# Patient Record
Sex: MALE | Race: WHITE | Employment: OTHER | URBAN - METROPOLITAN AREA
[De-identification: names, ages, dates, MRNs, and addresses within clinical notes are randomized per-mention and may not be internally consistent; named-entity substitution may affect disease eponyms.]

---

## 2017-05-25 PROBLEM — E66.811 OBESITY (BMI 30.0-34.9): Status: ACTIVE | Noted: 2017-05-25

## 2017-05-25 PROBLEM — R73.01 IMPAIRED FASTING GLUCOSE: Status: ACTIVE | Noted: 2017-05-25

## 2017-05-25 PROBLEM — I77.810 ECTATIC THORACIC AORTA (HCC): Status: ACTIVE | Noted: 2017-05-25

## 2017-05-25 PROBLEM — E66.9 OBESITY (BMI 30.0-34.9): Status: ACTIVE | Noted: 2017-05-25

## 2017-05-25 PROBLEM — I77.810 ECTATIC THORACIC AORTA: Status: ACTIVE | Noted: 2017-05-25

## 2017-12-29 PROBLEM — M18.12 DEGENERATIVE ARTHRITIS OF THUMB, LEFT: Status: ACTIVE | Noted: 2017-12-29

## 2018-10-18 PROBLEM — I77.1 TORTUOUS AORTA: Status: ACTIVE | Noted: 2018-10-18

## 2018-10-18 PROBLEM — I77.1 TORTUOUS AORTA (HCC): Status: ACTIVE | Noted: 2018-10-18

## 2018-11-01 ENCOUNTER — TELEPHONE (OUTPATIENT)
Dept: SURGERY | Facility: CLINIC | Age: 78
End: 2018-11-01

## 2018-11-01 NOTE — TELEPHONE ENCOUNTER
spoke with patient's PCP-.     Per  patient to be added on to 's schedule for tomorrow 11/2/18 at 10:30 am.

## 2018-11-02 ENCOUNTER — TELEPHONE (OUTPATIENT)
Dept: SURGERY | Facility: CLINIC | Age: 78
End: 2018-11-02

## 2018-11-02 ENCOUNTER — LAB ENCOUNTER (OUTPATIENT)
Dept: LAB | Age: 78
End: 2018-11-02
Attending: RADIOLOGY
Payer: MEDICARE

## 2018-11-02 ENCOUNTER — OFFICE VISIT (OUTPATIENT)
Dept: SURGERY | Facility: CLINIC | Age: 78
End: 2018-11-02
Payer: MEDICARE

## 2018-11-02 VITALS
HEART RATE: 60 BPM | WEIGHT: 186 LBS | SYSTOLIC BLOOD PRESSURE: 124 MMHG | BODY MASS INDEX: 29.19 KG/M2 | HEIGHT: 67 IN | DIASTOLIC BLOOD PRESSURE: 80 MMHG

## 2018-11-02 DIAGNOSIS — I72.9 ANEURYSM (HCC): Primary | ICD-10-CM

## 2018-11-02 DIAGNOSIS — I72.9 ANEURYSM (HCC): ICD-10-CM

## 2018-11-02 PROCEDURE — 85610 PROTHROMBIN TIME: CPT

## 2018-11-02 PROCEDURE — 99203 OFFICE O/P NEW LOW 30 MIN: CPT | Performed by: NEUROLOGICAL SURGERY

## 2018-11-02 PROCEDURE — 85025 COMPLETE CBC W/AUTO DIFF WBC: CPT

## 2018-11-02 PROCEDURE — 36415 COLL VENOUS BLD VENIPUNCTURE: CPT

## 2018-11-02 PROCEDURE — 85730 THROMBOPLASTIN TIME PARTIAL: CPT

## 2018-11-02 NOTE — PATIENT INSTRUCTIONS
Refill policies:    • Allow 2-3 business days for refills; controlled substances may take longer.   • Contact your pharmacy at least 5 days prior to running out of medication and have them send an electronic request or submit request through the “request re entire amount billed. Precertification and Prior Authorizations: If your physician has recommended that you have a procedure or additional testing performed.   [facility]) will contact your insurance carrier to obtain pre-certi herbal supplements for 10-14 days prior to procedure. · You may continue Aspirin, Plavix and nonsteroidal anti-inflammatories up to and including the day of procedure. · Nothing to eat or drink after midnight the night prior to procedure.    · Labs should

## 2018-11-02 NOTE — TELEPHONE ENCOUNTER
Patient scheduled for a Cerebrl Angiogram on November 14 2018 at 10:00  Discussed Angiogram instructions with patient:     · Arrive one hour prior to scheduled procedure start time and get registered at the [place], it is located at the [facility]

## 2018-11-02 NOTE — H&P
Neurosurgery Clinic Visit  2018    Tana Oviedo PCP:  Susan Landeros MD    3/5/1940 MRN [id number]       CHIEF COMPLAINT:  Patient presents with:   Follow - Up: Brain Aneurysm       HISTORY OF PRESENT ILLNESS:  Tana Oviedo is a(n) 70year old male wh Hearing is intact and symmetric. Neck: Reveals no masses. Breast:  Exam is deferred. Skin:  Exam reveals no obvious lesions or other indications of disease. A detailed skin exam was not performed.   Heart:  Regular rate and rhythm  Lungs: Clear to ausc with newly diagnosed aneurysms  I reviewed his case with Dr. Gary James, we recommend a cerebral angiogram  I reviewed the films with the patient.   We long discussion about treatment of aneurysms  We discussed observation versus coiling versus surgery  We kiran

## 2018-11-02 NOTE — PROGRESS NOTES
Pt is here for brain aneurysm. Imaging Review CT and CTA of the brain. MRI of the brain.  Pt states no symptome currently

## 2018-11-09 NOTE — TELEPHONE ENCOUNTER
PA not required. See referral pool for additional information. Nothing further needed for upcoming procedure.

## 2018-11-12 RX ORDER — ASPIRIN 81 MG/1
81 TABLET, CHEWABLE ORAL DAILY
COMMUNITY
End: 2019-02-28 | Stop reason: ALTCHOICE

## 2018-11-12 NOTE — TELEPHONE ENCOUNTER
Case being moved to 8:00 am start time. Patient to arrive at 7:00 am. Patient informed and had no further questions. Nothing further needed for upcoming surgery.

## 2018-11-14 ENCOUNTER — HOSPITAL ENCOUNTER (OUTPATIENT)
Dept: INTERVENTIONAL RADIOLOGY/VASCULAR | Facility: HOSPITAL | Age: 78
Discharge: HOME | End: 2018-11-14
Attending: RADIOLOGY | Admitting: RADIOLOGY
Payer: MEDICARE

## 2018-11-14 VITALS
DIASTOLIC BLOOD PRESSURE: 73 MMHG | RESPIRATION RATE: 11 BRPM | TEMPERATURE: 97 F | OXYGEN SATURATION: 95 % | HEIGHT: 67 IN | HEART RATE: 55 BPM | SYSTOLIC BLOOD PRESSURE: 111 MMHG | BODY MASS INDEX: 29.19 KG/M2 | WEIGHT: 186 LBS

## 2018-11-14 DIAGNOSIS — I72.9 ANEURYSM (HCC): ICD-10-CM

## 2018-11-14 PROCEDURE — 99152 MOD SED SAME PHYS/QHP 5/>YRS: CPT

## 2018-11-14 PROCEDURE — 76937 US GUIDE VASCULAR ACCESS: CPT

## 2018-11-14 PROCEDURE — 36224 PLACE CATH CAROTD ART: CPT

## 2018-11-14 PROCEDURE — 36226 PLACE CATH VERTEBRAL ART: CPT

## 2018-11-14 PROCEDURE — 99153 MOD SED SAME PHYS/QHP EA: CPT

## 2018-11-14 PROCEDURE — B31FYZZ FLUOROSCOPY OF LEFT VERTEBRAL ARTERY USING OTHER CONTRAST: ICD-10-PCS | Performed by: RADIOLOGY

## 2018-11-14 PROCEDURE — B318YZZ FLUOROSCOPY OF BILATERAL INTERNAL CAROTID ARTERIES USING OTHER CONTRAST: ICD-10-PCS | Performed by: RADIOLOGY

## 2018-11-14 PROCEDURE — B315YZZ FLUOROSCOPY OF BILATERAL COMMON CAROTID ARTERIES USING OTHER CONTRAST: ICD-10-PCS | Performed by: RADIOLOGY

## 2018-11-14 PROCEDURE — 76377 3D RENDER W/INTRP POSTPROCES: CPT

## 2018-11-14 RX ORDER — CEFAZOLIN SODIUM/WATER 2 G/20 ML
SYRINGE (ML) INTRAVENOUS
Status: COMPLETED
Start: 2018-11-14 | End: 2018-11-14

## 2018-11-14 RX ORDER — LIDOCAINE HYDROCHLORIDE 10 MG/ML
INJECTION, SOLUTION INFILTRATION; PERINEURAL
Status: COMPLETED
Start: 2018-11-14 | End: 2018-11-14

## 2018-11-14 RX ORDER — ONDANSETRON 2 MG/ML
4 INJECTION INTRAMUSCULAR; INTRAVENOUS EVERY 6 HOURS PRN
Status: DISCONTINUED | OUTPATIENT
Start: 2018-11-14 | End: 2018-11-14

## 2018-11-14 RX ORDER — MORPHINE SULFATE 4 MG/ML
1 INJECTION, SOLUTION INTRAMUSCULAR; INTRAVENOUS EVERY 2 HOUR PRN
Status: DISCONTINUED | OUTPATIENT
Start: 2018-11-14 | End: 2018-11-14

## 2018-11-14 RX ORDER — ACETAMINOPHEN 650 MG/1
650 SUPPOSITORY RECTAL EVERY 4 HOURS PRN
Status: DISCONTINUED | OUTPATIENT
Start: 2018-11-14 | End: 2018-11-14

## 2018-11-14 RX ORDER — METOCLOPRAMIDE HYDROCHLORIDE 5 MG/ML
10 INJECTION INTRAMUSCULAR; INTRAVENOUS EVERY 8 HOURS PRN
Status: DISCONTINUED | OUTPATIENT
Start: 2018-11-14 | End: 2018-11-14

## 2018-11-14 RX ORDER — HEPARIN SODIUM 5000 [USP'U]/ML
INJECTION, SOLUTION INTRAVENOUS; SUBCUTANEOUS
Status: COMPLETED
Start: 2018-11-14 | End: 2018-11-14

## 2018-11-14 RX ORDER — MIDAZOLAM HYDROCHLORIDE 1 MG/ML
INJECTION INTRAMUSCULAR; INTRAVENOUS
Status: COMPLETED
Start: 2018-11-14 | End: 2018-11-14

## 2018-11-14 RX ORDER — ACETAMINOPHEN 325 MG/1
650 TABLET ORAL EVERY 4 HOURS PRN
Status: DISCONTINUED | OUTPATIENT
Start: 2018-11-14 | End: 2018-11-14

## 2018-11-14 RX ORDER — MORPHINE SULFATE 4 MG/ML
2 INJECTION, SOLUTION INTRAMUSCULAR; INTRAVENOUS EVERY 2 HOUR PRN
Status: DISCONTINUED | OUTPATIENT
Start: 2018-11-14 | End: 2018-11-14

## 2018-11-14 NOTE — PROCEDURES
[facility]  Pre-Procedural Note  Daysi Wilkerson Patient Status:  Outpatient in a Bed    3/5/1940 MRN [id number]   Location [address] [facility] Attending Maribel Lopez MD   Hosp Day # 0 PCP Komal Salguero MD     Procedure: Cerebral a

## 2018-11-14 NOTE — PROCEDURES
[facility]  Neurointerventional Surgery Operative Report  Bennye Agent Patient Status:  Outpatient in a Bed    3/5/1940 MRN [id number]   Location [address] [facility] Attending Viet Guevara MD   Hosp Day # 0 PCP Praveena Gallo MD

## 2018-11-14 NOTE — PROGRESS NOTES
History & Physical Examination    Patient Name: Gloria Garcia  MRN: [id number]  CSN: [id number]  YOB: 1940    Diagnosis: Cerebral Aneurysms    Present Illness:   Patient was seen in the office with Dr. Zac Watts on 11/2/18.      Elijah Bennett is Procedure Laterality Date   • COLONOSCOPY  2011    wnl's   • OTHER SURGICAL HISTORY  2010    Epididimectomy; R testicle atrophied after the surgery. Was part of a vasectomy.     • OTHER SURGICAL HISTORY  201    Nasal polyp     Family History   Problem Rel

## 2018-11-20 ENCOUNTER — TELEPHONE (OUTPATIENT)
Dept: SURGERY | Facility: CLINIC | Age: 78
End: 2018-11-20

## 2018-11-21 NOTE — TELEPHONE ENCOUNTER
Informed patient that the plan was to discuss angiogram results at [facility] conference and then review with Dr. Tania Yung. Will have Dr. Tania Yung reach out to Dr. Zulay Vences about treatment plan and get back to him next week.      Patient states going to [place] in [facility]

## 2018-11-24 NOTE — PROCEDURES
PROCEDURE:  Cerebral angiography    : Dr Eleni Adrian, Interventional Neuroradiology / Neurointerventional Surgery     INDICATIONS: Multiple unruptured aneurysms, including large right pericallosal aneurysm.        COMPARISON:    PROCEDURES  PER observers and assisted in monitoring the patient’s level of consciousness and physiological status throughout the procedure, and had no other duties during the procedure.  The names of the IR nurses who served in this capacity are also noted in the IR flows right internal carotid artery with biplane projections and 3D spin angiogram centered over the head demonstrates a normal appearance of the carotid siphon.  A smooth 2.5mm aneurysm with a 1.5mm neck projects posteriorly from the dorsal origin of the left TRISR [facility] branches including both PCAs. There is good reflux sown the right vertebral artery with opacification of the right PICA.     CONCLUSION:      Multiple aneurysms: Right ESTIVEN Pericallosal 11mm saccular wide neck, R ACOM 3mm saccular, ROGER term 2.5mm saccular,

## 2018-11-27 NOTE — TELEPHONE ENCOUNTER
Informed pt of message below, he acknowledged and will wait for call from provider with POC    Discussed with , He states patient was presented at [facility] while he was away and has been trying to reach out to the Vascular Dr. There without

## 2018-11-28 NOTE — TELEPHONE ENCOUNTER
Dr. Pk Feliciano spoke to the patient. Per Dr. Pk Feliciano he was present at the Vascular Conference and the consensus was that this can not be coiled and they should proceed with open surgery.   Per Dr. Pk Feliciano this was already relayed to Dr. Mcdaniel Bolus however Dr. Nirmal Garcia

## 2018-11-29 NOTE — TELEPHONE ENCOUNTER
Called and spoke with pt  i talked to dr Cyrus Salgado  rec surgical clipping of acomm aneurysm  Pt agrees  Wants to wait until January for surgery  Sent info to dr Cyrus Salgado  He will see patient and we will plan from there

## 2018-11-30 NOTE — TELEPHONE ENCOUNTER
Pt called and informed he should call file room and request imaging, (angiograam and CTA) he can pick it up and take with to his upcoming appt. # to file room provided. No notes from [place] regarding this. Office notes printed and faxed.   Request sen

## 2018-11-30 NOTE — TELEPHONE ENCOUNTER
Pt called back stating there was no answer at file room but he left message. Informed him I would try as well. Called file room, left message requesting they have imaging ready for pt.

## 2018-11-30 NOTE — TELEPHONE ENCOUNTER
Dr. Donny Darling office calling for medical records for pt's appt w/them on 12/04; states Silvia Ghosh was working on getting records to them. Fax is [phone]. Imaging needed as well.

## 2018-12-03 ENCOUNTER — TELEPHONE (OUTPATIENT)
Dept: SURGERY | Facility: CLINIC | Age: 78
End: 2018-12-03

## 2018-12-03 NOTE — TELEPHONE ENCOUNTER
Called patient to inquire about obtaining imaging through [facility] including CTA/CTH and MRI images    Apparently he submitted a request last week on Thursday or Friday but was told it would take up to 5 business to obtain results.     I called [facility] imaging ([phone]

## 2018-12-03 NOTE — TELEPHONE ENCOUNTER
Isabella from IR ran pt's CD to [facility] office; PSR called pt to inform him we had the CD; pt states he received all necessary imaging from [facility] but would like this CD mailed to his home. CD placed in mail on 12/05.

## 2018-12-04 ENCOUNTER — TELEPHONE (OUTPATIENT)
Dept: SURGERY | Facility: CLINIC | Age: 78
End: 2018-12-04

## 2019-02-12 ENCOUNTER — TELEPHONE (OUTPATIENT)
Dept: SURGERY | Facility: CLINIC | Age: 79
End: 2019-02-12

## 2019-02-12 DIAGNOSIS — I72.9 ANEURYSM (HCC): Primary | ICD-10-CM

## 2019-02-12 NOTE — TELEPHONE ENCOUNTER
Patient scheduled for Diagnostic Cerebral angiogram on 4- at 0800    Discussed Angiogram instructions with patient:    · Arrive one hour prior to scheduled procedure start time and get registered at the [place], it is located at the [facility]

## 2019-02-28 ENCOUNTER — OFFICE VISIT (OUTPATIENT)
Dept: NEUROLOGY | Facility: CLINIC | Age: 79
End: 2019-02-28
Payer: MEDICARE

## 2019-02-28 VITALS
RESPIRATION RATE: 14 BRPM | DIASTOLIC BLOOD PRESSURE: 70 MMHG | WEIGHT: 188 LBS | SYSTOLIC BLOOD PRESSURE: 120 MMHG | BODY MASS INDEX: 29 KG/M2 | HEART RATE: 64 BPM

## 2019-02-28 DIAGNOSIS — I67.1 BRAIN ANEURYSM: ICD-10-CM

## 2019-02-28 DIAGNOSIS — G40.909 SEIZURE CEREBRAL (HCC): Primary | ICD-10-CM

## 2019-02-28 DIAGNOSIS — Z98.890 S/P CRANIOTOMY: ICD-10-CM

## 2019-02-28 PROCEDURE — 99204 OFFICE O/P NEW MOD 45 MIN: CPT | Performed by: OTHER

## 2019-02-28 RX ORDER — MAGNESIUM OXIDE 400 MG (241.3 MG MAGNESIUM) TABLET
400 TABLET DAILY
COMMUNITY
End: 2019-12-03 | Stop reason: ALTCHOICE

## 2019-02-28 RX ORDER — LEVETIRACETAM 1000 MG/1
1 TABLET ORAL 2 TIMES DAILY
Refills: 1 | COMMUNITY
Start: 2019-02-19 | End: 2019-02-28

## 2019-02-28 RX ORDER — LEVETIRACETAM 750 MG/1
750 TABLET ORAL 2 TIMES DAILY
Qty: 180 TABLET | Refills: 3 | Status: SHIPPED | OUTPATIENT
Start: 2019-02-28 | End: 2019-05-16

## 2019-02-28 NOTE — PROGRESS NOTES
[facility]   Neurology; INITIAL CLINIC VISIT  2019, 8:29 AM     Sherley Diaz Patient Status:  No patient class for patient encounter    3/5/1940 MRN [id number]   Location [address] [place] [address] FAMILY HISTORY:  family history includes Cancer in his mother; Other in his brother and father. SOCIAL HISTORY:   reports that he quit smoking about 11 years ago. He smoked 1.00 pack per day.  he has never used smokeless tobacco. He reports that he murmur  Extremities:  No edema or cyanosis, pulse is normal.  Skin:  No unusual lesions    Neurologic Examination:  Mental status: he is awake, alert, oriented to time, name and place, Mentally appropriate  for age;  Language and speech: language is intact always have a second opinion.    As far as fatigue is side effect of keppra, if he had a normal EEG as reported by wife we can cut down keppra to 750 mg bid till July, if he remains seizure free, then down to 500 mg bid,   If he is seizure free for a year,

## 2019-03-22 NOTE — TELEPHONE ENCOUNTER
Spoke to Taiwo at [facility], Cerebral Angiogram (90904, [id number]) is valid and billable, no copayment applies to the service, covered at 100% of the allowed amount. No prior authorization or predetermination required.  Call reference #[id number], time on call: 1

## 2019-04-10 ENCOUNTER — HOSPITAL ENCOUNTER (OUTPATIENT)
Dept: INTERVENTIONAL RADIOLOGY/VASCULAR | Facility: HOSPITAL | Age: 79
Discharge: HOME | End: 2019-04-10
Attending: RADIOLOGY | Admitting: RADIOLOGY
Payer: MEDICARE

## 2019-04-10 VITALS
WEIGHT: 182 LBS | SYSTOLIC BLOOD PRESSURE: 128 MMHG | HEART RATE: 49 BPM | TEMPERATURE: 97 F | OXYGEN SATURATION: 99 % | DIASTOLIC BLOOD PRESSURE: 95 MMHG | RESPIRATION RATE: 16 BRPM | BODY MASS INDEX: 27.58 KG/M2 | HEIGHT: 68 IN

## 2019-04-10 DIAGNOSIS — I72.9 ANEURYSM (HCC): ICD-10-CM

## 2019-04-10 PROCEDURE — 99152 MOD SED SAME PHYS/QHP 5/>YRS: CPT

## 2019-04-10 PROCEDURE — B318YZZ FLUOROSCOPY OF BILATERAL INTERNAL CAROTID ARTERIES USING OTHER CONTRAST: ICD-10-PCS | Performed by: RADIOLOGY

## 2019-04-10 PROCEDURE — 80048 BASIC METABOLIC PNL TOTAL CA: CPT | Performed by: RADIOLOGY

## 2019-04-10 PROCEDURE — 85025 COMPLETE CBC W/AUTO DIFF WBC: CPT | Performed by: RADIOLOGY

## 2019-04-10 PROCEDURE — 36415 COLL VENOUS BLD VENIPUNCTURE: CPT

## 2019-04-10 PROCEDURE — 85730 THROMBOPLASTIN TIME PARTIAL: CPT | Performed by: RADIOLOGY

## 2019-04-10 PROCEDURE — 36224 PLACE CATH CAROTD ART: CPT

## 2019-04-10 PROCEDURE — 76377 3D RENDER W/INTRP POSTPROCES: CPT

## 2019-04-10 PROCEDURE — 85610 PROTHROMBIN TIME: CPT | Performed by: RADIOLOGY

## 2019-04-10 PROCEDURE — 76937 US GUIDE VASCULAR ACCESS: CPT

## 2019-04-10 PROCEDURE — 99153 MOD SED SAME PHYS/QHP EA: CPT

## 2019-04-10 RX ORDER — ACETAMINOPHEN 650 MG/1
650 SUPPOSITORY RECTAL EVERY 4 HOURS PRN
Status: CANCELLED | OUTPATIENT
Start: 2019-04-10

## 2019-04-10 RX ORDER — MIDAZOLAM HYDROCHLORIDE 1 MG/ML
INJECTION INTRAMUSCULAR; INTRAVENOUS
Status: COMPLETED
Start: 2019-04-10 | End: 2019-04-10

## 2019-04-10 RX ORDER — MORPHINE SULFATE 4 MG/ML
2 INJECTION, SOLUTION INTRAMUSCULAR; INTRAVENOUS EVERY 2 HOUR PRN
Status: CANCELLED | OUTPATIENT
Start: 2019-04-10

## 2019-04-10 RX ORDER — ONDANSETRON 2 MG/ML
4 INJECTION INTRAMUSCULAR; INTRAVENOUS EVERY 6 HOURS PRN
Status: CANCELLED | OUTPATIENT
Start: 2019-04-10

## 2019-04-10 RX ORDER — LIDOCAINE HYDROCHLORIDE 10 MG/ML
INJECTION, SOLUTION INFILTRATION; PERINEURAL
Status: COMPLETED
Start: 2019-04-10 | End: 2019-04-10

## 2019-04-10 RX ORDER — MORPHINE SULFATE 4 MG/ML
1 INJECTION, SOLUTION INTRAMUSCULAR; INTRAVENOUS EVERY 2 HOUR PRN
Status: CANCELLED | OUTPATIENT
Start: 2019-04-10

## 2019-04-10 RX ORDER — HEPARIN SODIUM 5000 [USP'U]/ML
INJECTION, SOLUTION INTRAVENOUS; SUBCUTANEOUS
Status: COMPLETED
Start: 2019-04-10 | End: 2019-04-10

## 2019-04-10 RX ORDER — ACETAMINOPHEN 325 MG/1
650 TABLET ORAL EVERY 4 HOURS PRN
Status: CANCELLED | OUTPATIENT
Start: 2019-04-10

## 2019-04-10 RX ORDER — VERAPAMIL HYDROCHLORIDE 2.5 MG/ML
INJECTION, SOLUTION INTRAVENOUS
Status: DISCONTINUED
Start: 2019-04-10 | End: 2019-04-10 | Stop reason: WASHOUT

## 2019-04-10 RX ORDER — METOCLOPRAMIDE HYDROCHLORIDE 5 MG/ML
10 INJECTION INTRAMUSCULAR; INTRAVENOUS EVERY 8 HOURS PRN
Status: CANCELLED | OUTPATIENT
Start: 2019-04-10

## 2019-04-10 NOTE — PROGRESS NOTES
Pt s/p cerebral angiogram with Dr. Caity Soto. Right groin manual pressure, dressing CDI, no bleeding or hematoma. +1 pedal pulses. Neuro status unchanged and intact. Dr. Caity Soto came and spoke to pt and pt wife.  Discharge instructions reviewed with pt and pt w

## 2019-04-10 NOTE — H&P
History & Physical Examination    Patient Name: [place] Herrera  MRN: [id number]  CSN: [id number]  YOB: 1940    Diagnosis: Multiple unruptured aneurysms    Present Illness:   Juan Ghotra is a 70year old male who while undergoing work up for TIA (h APTT      26.1 - 34.6 seconds 27.9       No current facility-administered medications for this encounter.      Allergies: No Known Allergies    Past Medical History:   Diagnosis Date   • Cerebral aneurysm      Past Surgical History:   Procedure Laterality

## 2019-05-15 NOTE — PROCEDURES
[facility]  Neurointerventional Surgery Operative Report  Lauren Romano Patient Status:  Outpatient in a Bed    3/5/1940 MRN [id number]   Location [address] [facility] Attending No att. providers found   Hosp Day # 0 PCP Chema Bruno, dated 11/14/2018    PROCEDURES  PERFORMED:    Micropuncture access and sheath placement in the right common femoral artery using ultrasound guidance with image documentation. Selective catheterization and angiography of the right internal carotid artery. mins.    INFORMED CONSENT: The rationale, benefit, risks and alternatives to the procedure were explained to the patient and their family before the procedure.  They understood that the risks include but are not limited to stroke, vascular injury, headache, normal.    Angiography of the left internal carotid artery with 3D spin angiogram demonstrates a normal appearance of the carotid siphon apart from mild atheromatous contour undulation. There is no residual left pericallosal aneurysm.  The tiny sessile bump

## 2019-05-16 ENCOUNTER — OFFICE VISIT (OUTPATIENT)
Dept: SURGERY | Facility: CLINIC | Age: 79
End: 2019-05-16
Payer: MEDICARE

## 2019-05-16 ENCOUNTER — OFFICE VISIT (OUTPATIENT)
Dept: NEUROLOGY | Facility: CLINIC | Age: 79
End: 2019-05-16
Payer: MEDICARE

## 2019-05-16 VITALS
DIASTOLIC BLOOD PRESSURE: 68 MMHG | SYSTOLIC BLOOD PRESSURE: 112 MMHG | HEART RATE: 68 BPM | RESPIRATION RATE: 16 BRPM | BODY MASS INDEX: 30 KG/M2 | WEIGHT: 197 LBS

## 2019-05-16 VITALS — DIASTOLIC BLOOD PRESSURE: 76 MMHG | SYSTOLIC BLOOD PRESSURE: 122 MMHG | HEART RATE: 75 BPM

## 2019-05-16 DIAGNOSIS — I72.9 ANEURYSM (HCC): ICD-10-CM

## 2019-05-16 DIAGNOSIS — Z98.890 S/P CRANIOTOMY: Primary | ICD-10-CM

## 2019-05-16 DIAGNOSIS — G40.909 SEIZURE CEREBRAL (HCC): ICD-10-CM

## 2019-05-16 DIAGNOSIS — I67.1 BRAIN ANEURYSM: Primary | ICD-10-CM

## 2019-05-16 DIAGNOSIS — Z98.890 S/P CRANIOTOMY: ICD-10-CM

## 2019-05-16 PROCEDURE — 99213 OFFICE O/P EST LOW 20 MIN: CPT | Performed by: NEUROLOGICAL SURGERY

## 2019-05-16 PROCEDURE — 99214 OFFICE O/P EST MOD 30 MIN: CPT | Performed by: OTHER

## 2019-05-16 RX ORDER — IBUPROFEN 800 MG/1
800 TABLET ORAL EVERY 6 HOURS PRN
COMMUNITY
End: 2019-12-03 | Stop reason: ALTCHOICE

## 2019-05-16 RX ORDER — LEVETIRACETAM 500 MG/1
500 TABLET ORAL 2 TIMES DAILY
Qty: 180 TABLET | Refills: 3 | Status: SHIPPED | OUTPATIENT
Start: 2019-05-16 | End: 2019-09-27

## 2019-05-16 NOTE — PROGRESS NOTES
[facility]   Neurology; follow up  CLINIC VISIT  2019    Dominguez Nieto Patient Status:  No patient class for patient encounter    3/5/1940 MRN [id number]   Location [address] [place] [address], [address] [place] [address], [address] [place] [address] He has never used smokeless tobacco. He reports that he does not drink alcohol or use drugs. ALLERGIES:  No Known Allergies    MEDICATIONS:    Current Outpatient Medications:  AMOXICILLIN OR Take 1 capsule by mouth 3 (three) times daily.  Disp:  Rfl: are clear to auscultation  Heart: normal SR, no murmur  Extremities:  No edema or cyanosis, pulse is normal.  Skin:  No unusual lesions    Neurologic Examination:  Mental status: he is awake, alert, oriented to time, name and place, Mentally appropriate  f to driving.       fatigue is side effect of keppra,  Since he had a normal EEG in end of January at discharge,  as reported by wife we can cut down keppra to    500 mg bid,   If he is seizure free for a year, repeat EEG in Jan 2020, he will call back for or

## 2019-05-16 NOTE — PROGRESS NOTES
Pt reports no decline in condition since last visit. Pt does report loss of sense of taste and smell, pt also reports loss of stamina. Pt reports an episode where he experienced headaches.

## 2019-05-16 NOTE — PROGRESS NOTES
PT here for f/o post surgery. PT stated he feels relatively well. Site has heeled nicely, no HA, denies issues with vision, gait WNL, no bowel or bladder issues.

## 2019-05-16 NOTE — PROGRESS NOTES
Neurosurgery Clinic Visit  2019    Laine Jaeger PCP:  Claude Pate MD    3/5/1940 MRN [id number]     HISTORY OF PRESENT ILLNESS:  Laine Jaeger is a(n) 70year old male here for follow-up status post craniotomy for multiple aneurysm clippings  He is

## 2019-09-27 ENCOUNTER — PATIENT MESSAGE (OUTPATIENT)
Dept: NEUROLOGY | Facility: CLINIC | Age: 79
End: 2019-09-27

## 2019-09-27 DIAGNOSIS — G40.909 SEIZURE CEREBRAL (HCC): Primary | ICD-10-CM

## 2019-09-27 RX ORDER — LEVETIRACETAM 500 MG/1
500 TABLET ORAL 2 TIMES DAILY
Qty: 180 TABLET | Refills: 3 | Status: SHIPPED | OUTPATIENT
Start: 2019-09-27 | End: 2020-03-12 | Stop reason: ALTCHOICE

## 2019-09-27 NOTE — PROGRESS NOTES
Called [facility] [employer] thet last [facility] went to, D/C'd order due to new order being sent to a different pharmacy at pt request.

## 2019-09-27 NOTE — PROGRESS NOTES
Pt sent message through [employer] requesting refill of Keppra, pt has current RX with refills remaining, however pt requests refill be sent to different pharmacy. If new RX is signed, will call previous pharmacy and D/C prior RX.            Medication: lev

## 2019-09-27 NOTE — TELEPHONE ENCOUNTER
From: Marcela Kamara  To: Magdi Rubin MD  Sent: 9/27/2019 12:50 PM CDT  Subject: Prescription Question    Need new prescription for generic Keppra 250mg.  Please use [email or web address], Constantin Carcamo  Thank you,  [facility]

## 2019-09-30 ENCOUNTER — TELEPHONE (OUTPATIENT)
Dept: NEUROLOGY | Facility: CLINIC | Age: 79
End: 2019-09-30

## 2019-09-30 NOTE — PROGRESS NOTES
Patient states he his not having side effects other than being too tired to play golf some days. Patient states he is willing to stay on 500 mg BID, but would like to be seen in December 2019 instead of January 2020.  Patient would like to have EEG done

## 2019-09-30 NOTE — TELEPHONE ENCOUNTER
Pharmacist states there waas a note from patient from last [place] that is to be on Keppra 250mg. Pharmacist notified that [facility] nurse spoke with patient today and clarified that he is to take 500mg bid according to last MD note.

## 2019-09-30 NOTE — PROGRESS NOTES
Patient called and very upset that current Rx for Levetiracetam was sent to pharmacy for 500 mg BID.  Patient states he was to taper to 250 mg BID then stopped    Informed patient that Dr. Hector Iyer notes from [address] [place] [address] on 5/16/2019 were very clear that the patient

## 2019-10-01 NOTE — PROGRESS NOTES
Patient called RACHEL back today and is very upset that the original request of 250 mg Keppra was not honored.  Discussed with patient previous days discussion regarding Keppra dosage and informed patient that RN did discuss earlier EEG with provider and luz

## 2019-10-02 NOTE — PROGRESS NOTES
Patient has been scheduled for EEG and f/u with Dr. Esquivel Standing on 12/3/2019. Called patient and informed of above. Encouraged patient to contact [facility] to schedule f/u appointment on same day if he wishes.  Patient VU and denies any further needs at this marquita

## 2019-10-03 ENCOUNTER — TELEPHONE (OUTPATIENT)
Dept: SURGERY | Facility: CLINIC | Age: 79
End: 2019-10-03

## 2019-10-03 PROBLEM — I71.40 ABDOMINAL AORTIC ANEURYSM (AAA) WITHOUT RUPTURE: Status: ACTIVE | Noted: 2019-10-03

## 2019-10-03 PROBLEM — I70.0 AORTIC ATHEROSCLEROSIS: Status: ACTIVE | Noted: 2019-10-03

## 2019-10-03 PROBLEM — I71.40 ABDOMINAL AORTIC ANEURYSM (AAA) WITHOUT RUPTURE (HCC): Status: ACTIVE | Noted: 2019-10-03

## 2019-10-03 PROBLEM — I70.0 AORTIC ATHEROSCLEROSIS (HCC): Status: ACTIVE | Noted: 2019-10-03

## 2019-10-03 PROBLEM — F10.21 HISTORY OF ALCOHOL DEPENDENCE (HCC): Status: ACTIVE | Noted: 2019-10-03

## 2019-10-03 PROBLEM — I71.4 ABDOMINAL AORTIC ANEURYSM (AAA) WITHOUT RUPTURE (HCC): Status: ACTIVE | Noted: 2019-10-03

## 2019-10-03 NOTE — TELEPHONE ENCOUNTER
LM on personalized VM stating that patient may have EEG. Per Dr. Briseno Fresh: \"It is okay to have an EEG\".

## 2019-10-08 PROBLEM — E66.811 OBESITY (BMI 30.0-34.9): Status: RESOLVED | Noted: 2017-05-25 | Resolved: 2019-10-08

## 2019-10-08 PROBLEM — E66.9 OBESITY (BMI 30.0-34.9): Status: RESOLVED | Noted: 2017-05-25 | Resolved: 2019-10-08

## 2019-11-12 ENCOUNTER — PATIENT MESSAGE (OUTPATIENT)
Dept: SURGERY | Facility: CLINIC | Age: 79
End: 2019-11-12

## 2019-11-13 NOTE — TELEPHONE ENCOUNTER
From: Long Tay  To:  Abbey Gaming MD  Sent: 11/12/2019 7:27 PM CST  Subject: Non-Urgent Medical Question    Dr. Johana Villa,  I have been asked by my  as to the exact diagnosis of what was referred to as a TIA or mini-stroke on [place]

## 2019-11-13 NOTE — TELEPHONE ENCOUNTER
LOV on 5/16/19-  Per Dr. Blanka Yee:    \"REVIEW OF STUDIES:    Angiogram shows obliteration of the 3 aneurysms that were clipped, he is to smaller aneurysms that were in follow, they are stable        ASSESSMENT and PLAN:  82-year-old gentleman status post

## 2019-12-03 ENCOUNTER — OFFICE VISIT (OUTPATIENT)
Dept: SURGERY | Facility: CLINIC | Age: 79
End: 2019-12-03
Payer: MEDICARE

## 2019-12-03 ENCOUNTER — OFFICE VISIT (OUTPATIENT)
Dept: NEUROLOGY | Facility: CLINIC | Age: 79
End: 2019-12-03
Payer: MEDICARE

## 2019-12-03 ENCOUNTER — NURSE ONLY (OUTPATIENT)
Dept: NEUROLOGY | Facility: CLINIC | Age: 79
End: 2019-12-03
Payer: MEDICARE

## 2019-12-03 VITALS
SYSTOLIC BLOOD PRESSURE: 116 MMHG | HEART RATE: 80 BPM | DIASTOLIC BLOOD PRESSURE: 70 MMHG | WEIGHT: 198 LBS | RESPIRATION RATE: 14 BRPM | BODY MASS INDEX: 31 KG/M2

## 2019-12-03 DIAGNOSIS — I67.1 BRAIN ANEURYSM: Primary | ICD-10-CM

## 2019-12-03 DIAGNOSIS — Z98.890 S/P CRANIOTOMY: Primary | ICD-10-CM

## 2019-12-03 DIAGNOSIS — I72.9 ANEURYSM (HCC): ICD-10-CM

## 2019-12-03 DIAGNOSIS — Z98.890 S/P CRANIOTOMY: ICD-10-CM

## 2019-12-03 DIAGNOSIS — R56.9 SEIZURE (HCC): Primary | ICD-10-CM

## 2019-12-03 DIAGNOSIS — G40.909 SEIZURE CEREBRAL (HCC): ICD-10-CM

## 2019-12-03 PROCEDURE — 99214 OFFICE O/P EST MOD 30 MIN: CPT | Performed by: OTHER

## 2019-12-03 PROCEDURE — 99213 OFFICE O/P EST LOW 20 MIN: CPT | Performed by: PHYSICIAN ASSISTANT

## 2019-12-03 NOTE — PROCEDURES
[address]      PATIENT'S NAME: Barbarawilbert Leonila   ATTENDING PHYSICIAN: Vinayak Hodge M.D.    PATIENT ACCOUNT #: [de-identified] LOCATION:    MEDICAL RECORD #: [id number] YOB: 1940   DATE OF SE

## 2019-12-03 NOTE — PATIENT INSTRUCTIONS
Refill policies:    • Allow 2-3 business days for refills; controlled substances may take longer.   • Contact your pharmacy at least 5 days prior to running out of medication and have them send an electronic request or submit request through the “request re Depending on your insurance carrier, approval may take 3-10 days. It is highly recommended patients contact their insurance carrier directly to determine coverage.   If test is done without insurance authorization, patient may be responsible for the entire then  stop  If he has no seizure recurrence,

## 2019-12-03 NOTE — PROGRESS NOTES
Neurosurgery Clinic Visit  12/3/2019    Whitney Grandchild PCP:  Kathi Obrien MD    3/5/1940 MRN [id number]     HISTORY OF PRESENT ILLNESS:  Whitney Grandchild is a(n) 70year old male who is here s/p craniotomy for multiple aneurysm clippings.   He is feeling wel

## 2019-12-03 NOTE — PROGRESS NOTES
Pt is here for follow up. Pt was last seen in office 5/16/19    status post craniotomy for aneurysm clipping    Pt states that he has been doing well since LOV. Pt obtained EEG since LOV.      Pt states that he is taking Keppra 250 Mg

## 2019-12-04 ENCOUNTER — PATIENT MESSAGE (OUTPATIENT)
Dept: NEUROLOGY | Facility: CLINIC | Age: 79
End: 2019-12-04

## 2019-12-04 DIAGNOSIS — G40.909 SEIZURE CEREBRAL (HCC): Primary | ICD-10-CM

## 2019-12-04 RX ORDER — LEVETIRACETAM 250 MG/1
TABLET ORAL
Qty: 180 TABLET | Refills: 1 | Status: SHIPPED | OUTPATIENT
Start: 2019-12-04 | End: 2020-03-31

## 2019-12-04 NOTE — TELEPHONE ENCOUNTER
Pt calling in to request correction of Keppra sig, pt states Keppra dosing reduced at office visit on 12/03/2019 per note below. New order pended and routed to provider for signature.           Toney:  New onset seizure twice after his aneurysm surgery, o

## 2019-12-04 NOTE — TELEPHONE ENCOUNTER
From: Umm Montes  To: Ankit Wen MD  Sent: 12/4/2019 8:41 AM CST  Subject: Visit Follow-up Question    The medication list refers to the generic keppra dosage as 500mg twice a day. The dosage was reduced to 250 mg twice a day on 12-3-2019.

## 2020-01-24 ENCOUNTER — PATIENT MESSAGE (OUTPATIENT)
Dept: SURGERY | Facility: CLINIC | Age: 80
End: 2020-01-24

## 2020-01-27 NOTE — TELEPHONE ENCOUNTER
From: Vickie Burton  To: Magdalene Warren MD  Sent: 1/24/2020 9:22 PM CST  Subject: Non-Urgent Medical Question    Dr. Cornelius Denver had requested a radiological procedure before our appointment on March 12.  I am returning from [place] on March 1st. If

## 2020-01-27 NOTE — TELEPHONE ENCOUNTER
Per ELMA Pozo at LOV On 12/3/19:    \"REVIEW OF STUDIES:  Imaging studies were personally reviewed. EEG 12/3/2019 - normal study, no epileptiform activity identified.   Angiogram 4/10/2019 - interval clipping of the bilateral pericallosal and righ

## 2020-03-09 ENCOUNTER — HOSPITAL ENCOUNTER (OUTPATIENT)
Dept: CT IMAGING | Facility: HOSPITAL | Age: 80
Discharge: HOME | End: 2020-03-09
Attending: PHYSICIAN ASSISTANT
Payer: MEDICARE

## 2020-03-09 DIAGNOSIS — I72.9 ANEURYSM (HCC): ICD-10-CM

## 2020-03-09 DIAGNOSIS — Z98.890 S/P CRANIOTOMY: ICD-10-CM

## 2020-03-09 LAB — CREAT BLD-MCNC: 1 MG/DL (ref 0.7–1.3)

## 2020-03-09 PROCEDURE — 70496 CT ANGIOGRAPHY HEAD: CPT | Performed by: PHYSICIAN ASSISTANT

## 2020-03-09 PROCEDURE — 82565 ASSAY OF CREATININE: CPT

## 2020-03-12 ENCOUNTER — OFFICE VISIT (OUTPATIENT)
Dept: SURGERY | Facility: CLINIC | Age: 80
End: 2020-03-12
Payer: MEDICARE

## 2020-03-12 VITALS — HEART RATE: 64 BPM | DIASTOLIC BLOOD PRESSURE: 80 MMHG | SYSTOLIC BLOOD PRESSURE: 126 MMHG

## 2020-03-12 DIAGNOSIS — I72.9 ANEURYSM (HCC): ICD-10-CM

## 2020-03-12 DIAGNOSIS — Z98.890 S/P CRANIOTOMY: Primary | ICD-10-CM

## 2020-03-12 PROCEDURE — 99213 OFFICE O/P EST LOW 20 MIN: CPT | Performed by: PHYSICIAN ASSISTANT

## 2020-03-12 NOTE — PROGRESS NOTES
Pt is here for follow up to review imaging:     status post craniotomy for aneurysm clipping    CTA of the brain: Obtained     Pt states that he has been doing well since LOV.

## 2020-03-12 NOTE — PROGRESS NOTES
Neurosurgery Clinic Visit  3/12/2020    Janine Sears PCP:  Izabela Bruce MD    3/5/1940 MRN [id number]     HISTORY OF PRESENT ILLNESS:  Janine Sears is a(n) 68year old male who is here s/p craniotomy for multiple aneurysm clippings.   He is feeling wel care.  Siena Marie of education / counseling: Pathology, treatment options, and expected outcomes. Mary Moise M.S., TAI ROOT [facility]  [address] [facility], [facility] [place], [address] [place] [address]  [phone]  3/12/2020  8:43 AM

## 2020-03-31 ENCOUNTER — PATIENT MESSAGE (OUTPATIENT)
Dept: NEUROLOGY | Facility: CLINIC | Age: 80
End: 2020-03-31

## 2020-03-31 DIAGNOSIS — G40.909 SEIZURE CEREBRAL (HCC): ICD-10-CM

## 2020-03-31 RX ORDER — LEVETIRACETAM 250 MG/1
TABLET ORAL
Qty: 244 TABLET | Refills: 0 | Status: SHIPPED | OUTPATIENT
Start: 2020-03-31 | End: 2021-03-11

## 2020-03-31 NOTE — TELEPHONE ENCOUNTER
From: Chen Monday  To: Rayomnd Valera MD  Sent: 3/31/2020 9:10 AM CDT  Subject: Prescription Question    Dr. Mario Larson,    I am requesting a refill for 235 tablets, 250mg generic Keppra.    (72 days until Fátima 15 at 2/day=144; 91 days June 16 to September 15=91 at

## 2020-03-31 NOTE — TELEPHONE ENCOUNTER
Call transferred from [facility] staff. Is to wean slowly off Keppra over several months as discussed at [address] [facility]. Wanting to have Rx for the remaining 6 months to be combined into 1 Rx. Will send to provider.

## 2020-05-11 PROBLEM — J43.2 CENTRILOBULAR EMPHYSEMA (HCC): Status: ACTIVE | Noted: 2020-05-11

## 2020-05-12 PROBLEM — M16.0 PRIMARY OSTEOARTHRITIS OF BOTH HIPS: Status: ACTIVE | Noted: 2020-05-12

## 2021-02-09 DIAGNOSIS — Z23 NEED FOR VACCINATION: ICD-10-CM

## 2021-03-11 PROBLEM — M18.12 DEGENERATIVE ARTHRITIS OF THUMB, LEFT: Status: RESOLVED | Noted: 2017-12-29 | Resolved: 2021-03-11

## 2021-03-11 PROBLEM — E78.2 HYPERLIPEMIA, MIXED: Status: ACTIVE | Noted: 2021-03-11

## 2021-04-27 PROBLEM — M54.9 MID BACK PAIN ON LEFT SIDE: Status: ACTIVE | Noted: 2021-04-27

## 2021-04-27 PROBLEM — G89.29 CHRONIC BILATERAL LOW BACK PAIN WITHOUT SCIATICA: Status: ACTIVE | Noted: 2021-04-27

## 2021-04-27 PROBLEM — M54.50 CHRONIC BILATERAL LOW BACK PAIN WITHOUT SCIATICA: Status: ACTIVE | Noted: 2021-04-27

## 2021-07-11 ENCOUNTER — NURSE ONLY (OUTPATIENT)
Dept: LAB | Facility: HOSPITAL | Age: 81
DRG: 269 | End: 2021-07-11
Attending: SURGERY
Payer: MEDICARE

## 2021-07-11 DIAGNOSIS — Z01.818 PRE-OP TESTING: Primary | ICD-10-CM

## 2021-07-11 DIAGNOSIS — Z01.818 PRE-OP TESTING: ICD-10-CM

## 2021-07-11 LAB
ANION GAP SERPL CALC-SCNC: 5 MMOL/L (ref 0–18)
ANTIBODY SCREEN: NEGATIVE
APTT PPP: 30.9 SECONDS (ref 23.2–35.3)
BUN BLD-MCNC: 25 MG/DL (ref 7–18)
BUN/CREAT SERPL: 23.4 (ref 10–20)
CALCIUM BLD-MCNC: 9.4 MG/DL (ref 8.5–10.1)
CHLORIDE SERPL-SCNC: 107 MMOL/L (ref 98–112)
CO2 SERPL-SCNC: 28 MMOL/L (ref 21–32)
CREAT BLD-MCNC: 1.07 MG/DL
DEPRECATED RDW RBC AUTO: 50.1 FL (ref 35.1–46.3)
ERYTHROCYTE [DISTWIDTH] IN BLOOD BY AUTOMATED COUNT: 14.7 % (ref 11–15)
GLUCOSE BLD-MCNC: 76 MG/DL (ref 70–99)
HCT VFR BLD AUTO: 39.2 %
HGB BLD-MCNC: 12.5 G/DL
INR BLD: 1.12 (ref 0.9–1.2)
MCH RBC QN AUTO: 29.6 PG (ref 26–34)
MCHC RBC AUTO-ENTMCNC: 31.9 G/DL (ref 31–37)
MCV RBC AUTO: 92.7 FL
OSMOLALITY SERPL CALC.SUM OF ELEC: 293 MOSM/KG (ref 275–295)
PATIENT FASTING Y/N/NP: YES
PLATELET # BLD AUTO: 196 10(3)UL (ref 150–450)
POTASSIUM SERPL-SCNC: 3.9 MMOL/L (ref 3.5–5.1)
PROTHROMBIN TIME: 14.2 SECONDS (ref 11.8–14.5)
RBC # BLD AUTO: 4.23 X10(6)UL
RH BLOOD TYPE: POSITIVE
SODIUM SERPL-SCNC: 140 MMOL/L (ref 136–145)
WBC # BLD AUTO: 9.2 X10(3) UL (ref 4–11)

## 2021-07-11 PROCEDURE — 86920 COMPATIBILITY TEST SPIN: CPT

## 2021-07-11 PROCEDURE — 80048 BASIC METABOLIC PNL TOTAL CA: CPT

## 2021-07-11 PROCEDURE — 87641 MR-STAPH DNA AMP PROBE: CPT

## 2021-07-11 PROCEDURE — 85027 COMPLETE CBC AUTOMATED: CPT

## 2021-07-11 PROCEDURE — 85610 PROTHROMBIN TIME: CPT

## 2021-07-11 PROCEDURE — 36415 COLL VENOUS BLD VENIPUNCTURE: CPT

## 2021-07-11 PROCEDURE — 86900 BLOOD TYPING SEROLOGIC ABO: CPT

## 2021-07-11 PROCEDURE — 86850 RBC ANTIBODY SCREEN: CPT

## 2021-07-11 PROCEDURE — 86901 BLOOD TYPING SEROLOGIC RH(D): CPT

## 2021-07-11 PROCEDURE — 85730 THROMBOPLASTIN TIME PARTIAL: CPT

## 2021-07-12 LAB
MRSA DNA SPEC QL NAA+PROBE: NEGATIVE
SARS-COV-2 RNA RESP QL NAA+PROBE: NOT DETECTED

## 2021-07-14 ENCOUNTER — HOSPITAL ENCOUNTER (INPATIENT)
Dept: INTERVENTIONAL RADIOLOGY/VASCULAR | Facility: HOSPITAL | Age: 81
LOS: 1 days | Discharge: HOME | DRG: 269 | End: 2021-07-15
Attending: SURGERY | Admitting: SURGERY
Payer: MEDICARE

## 2021-07-14 ENCOUNTER — ANESTHESIA EVENT (OUTPATIENT)
Dept: INTERVENTIONAL RADIOLOGY/VASCULAR | Facility: HOSPITAL | Age: 81
DRG: 269 | End: 2021-07-14
Payer: MEDICARE

## 2021-07-14 DIAGNOSIS — Z01.818 PRE-OP TESTING: Primary | ICD-10-CM

## 2021-07-14 DIAGNOSIS — I71.4 ABDOMINAL AORTIC ANEURYSM (AAA) WITHOUT RUPTURE (HCC): ICD-10-CM

## 2021-07-14 PROCEDURE — B4101ZZ FLUOROSCOPY OF ABDOMINAL AORTA USING LOW OSMOLAR CONTRAST: ICD-10-PCS | Performed by: SURGERY

## 2021-07-14 PROCEDURE — 34713 PERQ ACCESS & CLSR FEM ART: CPT

## 2021-07-14 PROCEDURE — 037Y3ZZ DILATION OF UPPER ARTERY, PERCUTANEOUS APPROACH: ICD-10-PCS | Performed by: SURGERY

## 2021-07-14 PROCEDURE — 36415 COLL VENOUS BLD VENIPUNCTURE: CPT

## 2021-07-14 PROCEDURE — 34705 EVAC RPR A-BIILIAC NDGFT: CPT

## 2021-07-14 PROCEDURE — B41C1ZZ FLUOROSCOPY OF PELVIC ARTERIES USING LOW OSMOLAR CONTRAST: ICD-10-PCS | Performed by: SURGERY

## 2021-07-14 PROCEDURE — 94640 AIRWAY INHALATION TREATMENT: CPT

## 2021-07-14 PROCEDURE — 04V03EZ RESTRICTION OF ABDOMINAL AORTA WITH BRANCHED OR FENESTRATED INTRALUMINAL DEVICE, ONE OR TWO ARTERIES, PERCUTANEOUS APPROACH: ICD-10-PCS | Performed by: SURGERY

## 2021-07-14 RX ORDER — CEFAZOLIN SODIUM/WATER 2 G/20 ML
SYRINGE (ML) INTRAVENOUS
Status: DISCONTINUED
Start: 2021-07-14 | End: 2021-07-14 | Stop reason: WASHOUT

## 2021-07-14 RX ORDER — HYDROMORPHONE HYDROCHLORIDE 1 MG/ML
0.2 INJECTION, SOLUTION INTRAMUSCULAR; INTRAVENOUS; SUBCUTANEOUS EVERY 5 MIN PRN
Status: DISCONTINUED | OUTPATIENT
Start: 2021-07-14 | End: 2021-07-14 | Stop reason: HOSPADM

## 2021-07-14 RX ORDER — NEOSTIGMINE METHYLSULFATE 1 MG/ML
INJECTION INTRAVENOUS AS NEEDED
Status: DISCONTINUED | OUTPATIENT
Start: 2021-07-14 | End: 2021-07-14 | Stop reason: SURG

## 2021-07-14 RX ORDER — ENOXAPARIN SODIUM 100 MG/ML
40 INJECTION SUBCUTANEOUS DAILY
Status: DISCONTINUED | OUTPATIENT
Start: 2021-07-14 | End: 2021-07-15

## 2021-07-14 RX ORDER — EPHEDRINE SULFATE 50 MG/ML
INJECTION, SOLUTION INTRAVENOUS AS NEEDED
Status: DISCONTINUED | OUTPATIENT
Start: 2021-07-14 | End: 2021-07-14 | Stop reason: SURG

## 2021-07-14 RX ORDER — PROTAMINE SULFATE 10 MG/ML
INJECTION, SOLUTION INTRAVENOUS AS NEEDED
Status: DISCONTINUED | OUTPATIENT
Start: 2021-07-14 | End: 2021-07-14 | Stop reason: SURG

## 2021-07-14 RX ORDER — HYDROMORPHONE HYDROCHLORIDE 1 MG/ML
0.2 INJECTION, SOLUTION INTRAMUSCULAR; INTRAVENOUS; SUBCUTANEOUS EVERY 2 HOUR PRN
Status: DISCONTINUED | OUTPATIENT
Start: 2021-07-14 | End: 2021-07-15

## 2021-07-14 RX ORDER — ROCURONIUM BROMIDE 10 MG/ML
INJECTION, SOLUTION INTRAVENOUS AS NEEDED
Status: DISCONTINUED | OUTPATIENT
Start: 2021-07-14 | End: 2021-07-14 | Stop reason: SURG

## 2021-07-14 RX ORDER — ONDANSETRON 2 MG/ML
4 INJECTION INTRAMUSCULAR; INTRAVENOUS EVERY 6 HOURS PRN
Status: DISCONTINUED | OUTPATIENT
Start: 2021-07-14 | End: 2021-07-15

## 2021-07-14 RX ORDER — HYDROCODONE BITARTRATE AND ACETAMINOPHEN 5; 325 MG/1; MG/1
2 TABLET ORAL AS NEEDED
Status: DISCONTINUED | OUTPATIENT
Start: 2021-07-14 | End: 2021-07-14 | Stop reason: HOSPADM

## 2021-07-14 RX ORDER — HALOPERIDOL 5 MG/ML
0.25 INJECTION INTRAMUSCULAR ONCE AS NEEDED
Status: DISCONTINUED | OUTPATIENT
Start: 2021-07-14 | End: 2021-07-14 | Stop reason: HOSPADM

## 2021-07-14 RX ORDER — HYDROMORPHONE HYDROCHLORIDE 1 MG/ML
0.6 INJECTION, SOLUTION INTRAMUSCULAR; INTRAVENOUS; SUBCUTANEOUS EVERY 5 MIN PRN
Status: DISCONTINUED | OUTPATIENT
Start: 2021-07-14 | End: 2021-07-14 | Stop reason: HOSPADM

## 2021-07-14 RX ORDER — ACETAMINOPHEN 500 MG
1000 TABLET ORAL ONCE
Status: COMPLETED | OUTPATIENT
Start: 2021-07-14 | End: 2021-07-14

## 2021-07-14 RX ORDER — HYDROMORPHONE HYDROCHLORIDE 1 MG/ML
0.4 INJECTION, SOLUTION INTRAMUSCULAR; INTRAVENOUS; SUBCUTANEOUS EVERY 5 MIN PRN
Status: DISCONTINUED | OUTPATIENT
Start: 2021-07-14 | End: 2021-07-14 | Stop reason: HOSPADM

## 2021-07-14 RX ORDER — FLUTICASONE PROPIONATE AND SALMETEROL 250; 50 UG/1; UG/1
1 POWDER RESPIRATORY (INHALATION) EVERY 12 HOURS SCHEDULED
Status: DISCONTINUED | OUTPATIENT
Start: 2021-07-14 | End: 2021-07-14

## 2021-07-14 RX ORDER — SODIUM CHLORIDE, SODIUM LACTATE, POTASSIUM CHLORIDE, CALCIUM CHLORIDE 600; 310; 30; 20 MG/100ML; MG/100ML; MG/100ML; MG/100ML
INJECTION, SOLUTION INTRAVENOUS CONTINUOUS
Status: DISCONTINUED | OUTPATIENT
Start: 2021-07-14 | End: 2021-07-15

## 2021-07-14 RX ORDER — NALOXONE HYDROCHLORIDE 0.4 MG/ML
80 INJECTION, SOLUTION INTRAMUSCULAR; INTRAVENOUS; SUBCUTANEOUS AS NEEDED
Status: DISCONTINUED | OUTPATIENT
Start: 2021-07-14 | End: 2021-07-14 | Stop reason: HOSPADM

## 2021-07-14 RX ORDER — PROCHLORPERAZINE EDISYLATE 5 MG/ML
5 INJECTION INTRAMUSCULAR; INTRAVENOUS ONCE AS NEEDED
Status: DISCONTINUED | OUTPATIENT
Start: 2021-07-14 | End: 2021-07-14 | Stop reason: HOSPADM

## 2021-07-14 RX ORDER — ACETAMINOPHEN 500 MG
TABLET ORAL
Status: DISPENSED
Start: 2021-07-14 | End: 2021-07-14

## 2021-07-14 RX ORDER — HEPARIN SODIUM 1000 [USP'U]/ML
INJECTION, SOLUTION INTRAVENOUS; SUBCUTANEOUS
Status: COMPLETED
Start: 2021-07-14 | End: 2021-07-14

## 2021-07-14 RX ORDER — HEPARIN SODIUM 1000 [USP'U]/ML
INJECTION, SOLUTION INTRAVENOUS; SUBCUTANEOUS AS NEEDED
Status: DISCONTINUED | OUTPATIENT
Start: 2021-07-14 | End: 2021-07-14 | Stop reason: SURG

## 2021-07-14 RX ORDER — SODIUM CHLORIDE 0.9 % (FLUSH) 0.9 %
10 SYRINGE (ML) INJECTION AS NEEDED
Status: DISCONTINUED | OUTPATIENT
Start: 2021-07-14 | End: 2021-07-15

## 2021-07-14 RX ORDER — HYDROCODONE BITARTRATE AND ACETAMINOPHEN 5; 325 MG/1; MG/1
1 TABLET ORAL AS NEEDED
Status: DISCONTINUED | OUTPATIENT
Start: 2021-07-14 | End: 2021-07-14 | Stop reason: HOSPADM

## 2021-07-14 RX ORDER — MIDAZOLAM HYDROCHLORIDE 1 MG/ML
INJECTION INTRAMUSCULAR; INTRAVENOUS
Status: DISCONTINUED
Start: 2021-07-14 | End: 2021-07-14 | Stop reason: WASHOUT

## 2021-07-14 RX ORDER — ONDANSETRON 2 MG/ML
INJECTION INTRAMUSCULAR; INTRAVENOUS AS NEEDED
Status: DISCONTINUED | OUTPATIENT
Start: 2021-07-14 | End: 2021-07-14 | Stop reason: SURG

## 2021-07-14 RX ORDER — SODIUM CHLORIDE, SODIUM LACTATE, POTASSIUM CHLORIDE, CALCIUM CHLORIDE 600; 310; 30; 20 MG/100ML; MG/100ML; MG/100ML; MG/100ML
INJECTION, SOLUTION INTRAVENOUS CONTINUOUS
Status: DISCONTINUED | OUTPATIENT
Start: 2021-07-14 | End: 2021-07-14 | Stop reason: HOSPADM

## 2021-07-14 RX ORDER — CEFAZOLIN SODIUM/WATER 2 G/20 ML
SYRINGE (ML) INTRAVENOUS AS NEEDED
Status: DISCONTINUED | OUTPATIENT
Start: 2021-07-14 | End: 2021-07-14 | Stop reason: SURG

## 2021-07-14 RX ORDER — SODIUM CHLORIDE 9 MG/ML
INJECTION, SOLUTION INTRAVENOUS CONTINUOUS PRN
Status: DISCONTINUED | OUTPATIENT
Start: 2021-07-14 | End: 2021-07-14 | Stop reason: SURG

## 2021-07-14 RX ORDER — LIDOCAINE HYDROCHLORIDE 10 MG/ML
INJECTION, SOLUTION EPIDURAL; INFILTRATION; INTRACAUDAL; PERINEURAL AS NEEDED
Status: DISCONTINUED | OUTPATIENT
Start: 2021-07-14 | End: 2021-07-14 | Stop reason: SURG

## 2021-07-14 RX ORDER — MORPHINE SULFATE 2 MG/ML
2 INJECTION, SOLUTION INTRAMUSCULAR; INTRAVENOUS EVERY 10 MIN PRN
Status: DISCONTINUED | OUTPATIENT
Start: 2021-07-14 | End: 2021-07-14 | Stop reason: HOSPADM

## 2021-07-14 RX ORDER — ONDANSETRON 2 MG/ML
4 INJECTION INTRAMUSCULAR; INTRAVENOUS ONCE AS NEEDED
Status: DISCONTINUED | OUTPATIENT
Start: 2021-07-14 | End: 2021-07-14 | Stop reason: HOSPADM

## 2021-07-14 RX ORDER — MORPHINE SULFATE 10 MG/ML
6 INJECTION, SOLUTION INTRAMUSCULAR; INTRAVENOUS EVERY 10 MIN PRN
Status: DISCONTINUED | OUTPATIENT
Start: 2021-07-14 | End: 2021-07-14 | Stop reason: HOSPADM

## 2021-07-14 RX ORDER — DEXAMETHASONE SODIUM PHOSPHATE 4 MG/ML
VIAL (ML) INJECTION AS NEEDED
Status: DISCONTINUED | OUTPATIENT
Start: 2021-07-14 | End: 2021-07-14 | Stop reason: SURG

## 2021-07-14 RX ORDER — CEFAZOLIN SODIUM/WATER 2 G/20 ML
2 SYRINGE (ML) INTRAVENOUS EVERY 8 HOURS
Status: COMPLETED | OUTPATIENT
Start: 2021-07-14 | End: 2021-07-14

## 2021-07-14 RX ORDER — HYDROCODONE BITARTRATE AND ACETAMINOPHEN 5; 325 MG/1; MG/1
1 TABLET ORAL EVERY 4 HOURS PRN
Status: DISCONTINUED | OUTPATIENT
Start: 2021-07-14 | End: 2021-07-15

## 2021-07-14 RX ORDER — ACETAMINOPHEN 325 MG/1
650 TABLET ORAL EVERY 4 HOURS PRN
Status: DISCONTINUED | OUTPATIENT
Start: 2021-07-14 | End: 2021-07-15

## 2021-07-14 RX ORDER — GLYCOPYRROLATE 0.2 MG/ML
INJECTION, SOLUTION INTRAMUSCULAR; INTRAVENOUS AS NEEDED
Status: DISCONTINUED | OUTPATIENT
Start: 2021-07-14 | End: 2021-07-14 | Stop reason: SURG

## 2021-07-14 RX ORDER — HYDROCODONE BITARTRATE AND ACETAMINOPHEN 5; 325 MG/1; MG/1
2 TABLET ORAL EVERY 4 HOURS PRN
Status: DISCONTINUED | OUTPATIENT
Start: 2021-07-14 | End: 2021-07-15

## 2021-07-14 RX ORDER — MORPHINE SULFATE 4 MG/ML
4 INJECTION, SOLUTION INTRAMUSCULAR; INTRAVENOUS EVERY 10 MIN PRN
Status: DISCONTINUED | OUTPATIENT
Start: 2021-07-14 | End: 2021-07-14 | Stop reason: HOSPADM

## 2021-07-14 RX ORDER — BUPIVACAINE HYDROCHLORIDE 5 MG/ML
INJECTION, SOLUTION EPIDURAL; INTRACAUDAL
Status: COMPLETED
Start: 2021-07-14 | End: 2021-07-14

## 2021-07-14 RX ADMIN — NEOSTIGMINE METHYLSULFATE 3 MG: 1 INJECTION INTRAVENOUS at 09:24:00

## 2021-07-14 RX ADMIN — ENOXAPARIN SODIUM 40 MG: 100 INJECTION SUBCUTANEOUS at 18:21:00

## 2021-07-14 RX ADMIN — SODIUM CHLORIDE, SODIUM LACTATE, POTASSIUM CHLORIDE, CALCIUM CHLORIDE: 600; 310; 30; 20 INJECTION, SOLUTION INTRAVENOUS at 07:34:00

## 2021-07-14 RX ADMIN — SODIUM CHLORIDE: 9 INJECTION, SOLUTION INTRAVENOUS at 09:38:00

## 2021-07-14 RX ADMIN — ACETAMINOPHEN 1000 MG: 500 MG TABLET ORAL at 06:45:00

## 2021-07-14 RX ADMIN — ROCURONIUM BROMIDE 30 MG: 10 INJECTION, SOLUTION INTRAVENOUS at 07:50:00

## 2021-07-14 RX ADMIN — ONDANSETRON 4 MG: 2 INJECTION INTRAMUSCULAR; INTRAVENOUS at 09:25:00

## 2021-07-14 RX ADMIN — PROTAMINE SULFATE 40 MG: 10 INJECTION, SOLUTION INTRAVENOUS at 09:07:00

## 2021-07-14 RX ADMIN — CEFAZOLIN SODIUM/WATER 2 G: 2 G/20 ML SYRINGE (ML) INTRAVENOUS at 22:21:00

## 2021-07-14 RX ADMIN — SODIUM CHLORIDE, SODIUM LACTATE, POTASSIUM CHLORIDE, CALCIUM CHLORIDE: 600; 310; 30; 20 INJECTION, SOLUTION INTRAVENOUS at 21:23:00

## 2021-07-14 RX ADMIN — SODIUM CHLORIDE, SODIUM LACTATE, POTASSIUM CHLORIDE, CALCIUM CHLORIDE: 600; 310; 30; 20 INJECTION, SOLUTION INTRAVENOUS at 09:38:00

## 2021-07-14 RX ADMIN — HYDROCODONE BITARTRATE AND ACETAMINOPHEN 1 TABLET: 5; 325 TABLET ORAL at 21:25:00

## 2021-07-14 RX ADMIN — SODIUM CHLORIDE, SODIUM LACTATE, POTASSIUM CHLORIDE, CALCIUM CHLORIDE: 600; 310; 30; 20 INJECTION, SOLUTION INTRAVENOUS at 18:30:00

## 2021-07-14 RX ADMIN — EPHEDRINE SULFATE 5 MG: 50 INJECTION, SOLUTION INTRAVENOUS at 07:57:00

## 2021-07-14 RX ADMIN — GLYCOPYRROLATE 0.6 MG: 0.2 INJECTION, SOLUTION INTRAMUSCULAR; INTRAVENOUS at 09:24:00

## 2021-07-14 RX ADMIN — CEFAZOLIN SODIUM/WATER 2 G: 2 G/20 ML SYRINGE (ML) INTRAVENOUS at 18:29:00

## 2021-07-14 RX ADMIN — DEXAMETHASONE SODIUM PHOSPHATE 4 MG: 4 MG/ML VIAL (ML) INJECTION at 09:24:00

## 2021-07-14 RX ADMIN — HEPARIN SODIUM 5000 UNITS: 1000 INJECTION, SOLUTION INTRAVENOUS; SUBCUTANEOUS at 08:32:00

## 2021-07-14 RX ADMIN — LIDOCAINE HYDROCHLORIDE 25 MG: 10 INJECTION, SOLUTION EPIDURAL; INFILTRATION; INTRACAUDAL; PERINEURAL at 07:39:00

## 2021-07-14 RX ADMIN — SODIUM CHLORIDE: 9 INJECTION, SOLUTION INTRAVENOUS at 07:45:00

## 2021-07-14 RX ADMIN — CEFAZOLIN SODIUM/WATER 2 G: 2 G/20 ML SYRINGE (ML) INTRAVENOUS at 07:54:00

## 2021-07-14 RX ADMIN — EPHEDRINE SULFATE 10 MG: 50 INJECTION, SOLUTION INTRAVENOUS at 08:00:00

## 2021-07-14 RX ADMIN — EPHEDRINE SULFATE 5 MG: 50 INJECTION, SOLUTION INTRAVENOUS at 07:54:00

## 2021-07-14 NOTE — ANESTHESIA PROCEDURE NOTES
Peripheral IV  Date/Time: 7/14/2021 7:45 AM  Inserted by: Kat Jung MD    Placement  Needle size: 16 G  Laterality: left  Location: forearm  Site prep: alcohol  Technique: anatomical landmarks  Attempts: 1

## 2021-07-14 NOTE — ANESTHESIA PROCEDURE NOTES
Airway  Date/Time: 7/14/2021 7:40 AM  Urgency: Elective      General Information and Staff    Patient location during procedure: OR  Anesthesiologist: Rachael Whitley MD  Performed: anesthesiologist     Indications and Patient Condition  Indications for a

## 2021-07-14 NOTE — H&P
Suzan Hoff, [address] [facility]  Vascular and Endovascular Surgery  [facility]                    VASCULAR SURGERY   HPNOTE           Name: Ra Garibay   :   3/5/1940  [id number]      REFERRING PHYSICIAN:  No ref.  provider found  PRIM HPI.      EXAM:     /77 (BP Location: Left arm)   Ht 5' 7\" (1.702 m)   Wt 180 lb (81.6 kg)   BMI 28.19 kg/m²      RESPIRATORY: no rales, rhonchi, or wheezes B  CARDIO: RRR without murmur, no murmur, no gallop   ABDOMEN: soft, non-tender   VASCULAR: P confusing, it is likely do to a failure of recognition.    Please contact me with any questions or clarifica

## 2021-07-14 NOTE — RESPIRATORY THERAPY NOTE
IS in the room. Pt unable to sit up, RT will return for IS education. Pt denies the use of Oxygen at home or the use of CPAP for MARIBETH.

## 2021-07-14 NOTE — OPERATIVE REPORT
[facility]     PATIENTS NAME: Kae Sim  ATTENDING PHYSICIAN: Ld Aparicio MD  OPERATING PHYSICIAN: Delgado Dejesus MD  CSN: [id number]     LOCATION:  OR  MRN: [id number]    YOB: 1940  ADMISSION DATE: 7/14/2021  OPERATION DATE: 7 anticoagulated with 5000 units of heparin. The sheaths were then exchanged for a 16-Bengali sheath on the right and 16-Fr sheath on the left over 180-cm superstiff Amplatz wires.   A 5-Fr pigtail catheter was then advanced via the left side and an aortogram over an Amplatz wire and a retrograde left iliac angiogram was obtained to thien the common iliac artery bifurcation and confirm the length of the desired limb.  A bridge device was needed to allow full coverage of the limb and a 16 mm x 14.5 mm x 10 cm was

## 2021-07-14 NOTE — ANESTHESIA PREPROCEDURE EVALUATION
Anesthesia PreOp Note    HPI:     Cornelio Candelario is a 80year old male who presents for preoperative consultation requested by: * No surgeons listed *    Date of Surgery: 7/14/2021    * No procedures listed *  Indication: * No pre-op diagnosis entered *    R time  selenium sulfide 2.5 % External Lotion, Apply daily as needed, Disp: 118 mL, Rfl: 3, Unknown at Unknown time      lactated ringers infusion, , Intravenous, Continuous, Najjar, Samer F, MD  acetaminophen (TYLENOL EXTRA STRENGTH) tab 1,000 mg, 1,000 mg Worried About [facility][1] in the Last Year:       Ran Out of Food in the Last Year:   Transportation Needs:       Lack of Transportation (Medical):       Lack of Transportation (Non-Medical):   Physical Activity:       Days of Exercise per Week: normal  ROS comment: AAA    Neuro/Psych      GI/Hepatic/Renal      Endo/Other    Abdominal              Anesthesia Plan:   ASA:  3  Plan:   General  Monitors and Lines:   A-line and Additonal IV  Airway:  ETT  Informed Consent Plan and Risks Discussed With

## 2021-07-14 NOTE — ANESTHESIA PROCEDURE NOTES
Arterial Line  Performed by: Rodrick Ghotra MD  Authorized by: Rodrick Ghotra MD     General Information and Staff    Procedure Start:  7/14/2021 7:45 AM  Anesthesiologist:  Rodrick Ghotra MD  Performed By:  Anesthesiologist  Patient Location:  OR

## 2021-07-14 NOTE — PROGRESS NOTES
[facility] Pharmacy Note:  Therapeutic Interchange    Darlene Avery was previously taking fluticasone/salmeterol 250/50 mcg inhaled every 12 hours at home prior to admission.      Per therapeutic interchange, the patient will be switched to fluticasone/vilanterol 2

## 2021-07-14 NOTE — ANESTHESIA POSTPROCEDURE EVALUATION
Patient: Michaela Wheat    Procedure Summary     Date: 07/14/21 Room / Location: [place] [address].    Anesthesia Start: 4555 Anesthesia Stop: 1365    Procedure: IR REPAIR AAA ENDOVASCULAR Diagnosis:       Abdominal aortic aneurysm (AAA)

## 2021-07-15 VITALS
WEIGHT: 180 LBS | DIASTOLIC BLOOD PRESSURE: 61 MMHG | RESPIRATION RATE: 14 BRPM | BODY MASS INDEX: 28.25 KG/M2 | HEIGHT: 67 IN | OXYGEN SATURATION: 91 % | TEMPERATURE: 98 F | HEART RATE: 57 BPM | SYSTOLIC BLOOD PRESSURE: 108 MMHG

## 2021-07-15 LAB
ANION GAP SERPL CALC-SCNC: 3 MMOL/L (ref 0–18)
BLOOD TYPE BARCODE: 8400
BUN BLD-MCNC: 14 MG/DL (ref 7–18)
BUN/CREAT SERPL: 16.3 (ref 10–20)
CALCIUM BLD-MCNC: 8.6 MG/DL (ref 8.5–10.1)
CHLORIDE SERPL-SCNC: 108 MMOL/L (ref 98–112)
CO2 SERPL-SCNC: 27 MMOL/L (ref 21–32)
CREAT BLD-MCNC: 0.86 MG/DL
DEPRECATED RDW RBC AUTO: 49.7 FL (ref 35.1–46.3)
ERYTHROCYTE [DISTWIDTH] IN BLOOD BY AUTOMATED COUNT: 14.9 % (ref 11–15)
GLUCOSE BLD-MCNC: 107 MG/DL (ref 70–99)
HCT VFR BLD AUTO: 35.6 %
HGB BLD-MCNC: 11.8 G/DL
MCH RBC QN AUTO: 30.2 PG (ref 26–34)
MCHC RBC AUTO-ENTMCNC: 33.1 G/DL (ref 31–37)
MCV RBC AUTO: 91 FL
OSMOLALITY SERPL CALC.SUM OF ELEC: 287 MOSM/KG (ref 275–295)
PLATELET # BLD AUTO: 172 10(3)UL (ref 150–450)
POTASSIUM SERPL-SCNC: 4.1 MMOL/L (ref 3.5–5.1)
RBC # BLD AUTO: 3.91 X10(6)UL
SODIUM SERPL-SCNC: 138 MMOL/L (ref 136–145)
WBC # BLD AUTO: 14.6 X10(3) UL (ref 4–11)

## 2021-07-15 PROCEDURE — 36592 COLLECT BLOOD FROM PICC: CPT

## 2021-07-15 PROCEDURE — 80048 BASIC METABOLIC PNL TOTAL CA: CPT | Performed by: SURGERY

## 2021-07-15 PROCEDURE — 85027 COMPLETE CBC AUTOMATED: CPT | Performed by: SURGERY

## 2021-07-15 RX ORDER — HYDROCODONE BITARTRATE AND ACETAMINOPHEN 5; 325 MG/1; MG/1
2 TABLET ORAL EVERY 4 HOURS PRN
Qty: 10 TABLET | Refills: 0 | Status: SHIPPED | OUTPATIENT
Start: 2021-07-15 | End: 2021-09-22

## 2021-07-15 RX ADMIN — ENOXAPARIN SODIUM 40 MG: 100 INJECTION SUBCUTANEOUS at 08:44:00

## 2021-07-15 NOTE — PLAN OF CARE
Patient doing extremely well after surgery. Vitals stable, in good spirits. Family at bedside and kept updated on plan of care and post-op expectations. Right radial art line in place and working well. LR infusing at 100 cc/hr. Rosado in place.  Patient kept hypotension and signs of decreased cardiac output  - Evaluate effectiveness of vasoactive medications to optimize hemodynamic stability  - Monitor arterial and/or venous puncture sites for bleeding and/or hematoma  - Assess quality of pulses, skin color an Initiate interventions, skin care algorithm/standards of care as needed  Outcome: Progressing  Goal: Incision(s), wounds(s) or drain site(s) healing without S/S of infection  Description: INTERVENTIONS:  - Assess and document risk factors for pressure ulce

## 2021-07-15 NOTE — PLAN OF CARE
Patient a&ox3. NSR/lizet on monitor with soft BP. Complaints of chronic pain in back. PT pulses palpable. Pedal pulses found with doppler. Procedure sites soft, slight bruising on left side. Fluids infusing.  Bristol and hope removed in AM. Up to chair with signs of decreased coronary artery perfusion - ex.  Angina  - Evaluate fluid balance, assess for edema, trend weights  Outcome: Progressing     Problem: RESPIRATORY - ADULT  Goal: Achieves optimal ventilation and oxygenation  Description: INTERVENTIONS:  - document skin integrity  - Assess and document dressing/incision, wound bed, drain sites and surrounding tissue  - Implement wound care per orders  - Initiate isolation precautions as appropriate  - Initiate Pressure Ulcer prevention bundle as indicated  O

## 2021-07-15 NOTE — PLAN OF CARE
Problem: Patient Centered Care  Goal: Patient preferences are identified and integrated in the patient's plan of care  Description: Interventions:  - What would you like us to know as we care for you? I love to golf.    - Provide timely, complete, and acc oxygenation  Description: INTERVENTIONS:  - Assess for changes in respiratory status  - Assess for changes in mentation and behavior  - Position to facilitate oxygenation and minimize respiratory effort  - Oxygen supplementation based on oxygen saturation appropriate  - Initiate Pressure Ulcer prevention bundle as indicated  Outcome: Adequate for Discharge   Patient to be discharged home with family support. Follow up appointments will be made.

## 2021-07-15 NOTE — PROGRESS NOTES
[facility]  Vascular Surgery Progress Note    Gloria Garcia Patient Status:  Inpatient    3/5/1940 MRN [id number]   Location [facility] 2W/SW Attending Lakhwinder Mota MD   Hosp Day # 1 PCP Luis Enrique Harris MD     Objective:   Temp: 97.9 °F for input(s): ALT, AST, ALB, AMYLASE, LIPASE, LDH in the last 168 hours. Invalid input(s): ALPHOS, TBIL, DBIL, TPROT  No results for input(s): TROP in the last 168 hours.   No results found for: ANAS, RADHA, ANASCRN  No results for input(s): PCACT, PSACT,

## 2021-09-22 PROBLEM — K43.2 INCISIONAL HERNIA OF ANTERIOR ABDOMINAL WALL WITHOUT OBSTRUCTION OR GANGRENE: Status: ACTIVE | Noted: 2021-09-22

## 2021-09-22 PROBLEM — I72.3 ILIAC ARTERY ANEURYSM, LEFT (HCC): Status: ACTIVE | Noted: 2021-09-22

## 2021-09-22 PROBLEM — I72.3 ILIAC ARTERY ANEURYSM, LEFT: Status: ACTIVE | Noted: 2021-09-22

## 2022-11-20 ENCOUNTER — PATIENT MESSAGE (OUTPATIENT)
Dept: SURGERY | Facility: CLINIC | Age: 82
End: 2022-11-20

## 2022-11-21 NOTE — TELEPHONE ENCOUNTER
From: Eduardo Bella  To: Tigist Jhaveri MD  Sent: 11/20/2022 12:44 PM CST  Subject: Radiology    Dr. Linda Conde, Would it be duong to schedule another scan of my brain to check on the 5 titanium clips in my head and check the size of the aneurysms left unclipped?   Ramos Busch

## 2022-11-21 NOTE — TELEPHONE ENCOUNTER
Received feedback from SURAJ Ocasio and called patient with information. Patient acknowledged and stated that he will consider his options and call RACHEL back to schedule with DAVE doctor if he does not go back to see Dr. Daisy Guerrero. Contact information provided to patient for RACHEL to schedule if he so chooses. Patient thanked Nursing for the call and the information.

## 2022-11-21 NOTE — TELEPHONE ENCOUNTER
Is he following with anyone downtown for his aneurysms?   If not, he should re-establish with DAVE here

## 2023-05-03 ENCOUNTER — TELEPHONE (OUTPATIENT)
Dept: SURGERY | Facility: CLINIC | Age: 83
End: 2023-05-03

## 2023-05-03 DIAGNOSIS — I72.9 ANEURYSM (HCC): Primary | ICD-10-CM

## 2023-05-03 NOTE — TELEPHONE ENCOUNTER
Pt calling to re-establish care with a provider, trinidad Mayberry. Pt needs imaging ordered. Pt hasn't been seen since 3/2020 for aneurysm. Which provider would be best to follow up with?

## 2023-05-03 NOTE — TELEPHONE ENCOUNTER
Pt scheduled for 5/15/23 at 11:00 with Dr. Bill Baron, please advise if pt needs orders before being seen.

## 2023-05-04 NOTE — TELEPHONE ENCOUNTER
Noted the providers feedback listed below. Noted patient is active on mychart. Sent patient a [employer] message to inform of imaging.

## 2023-05-05 NOTE — TELEPHONE ENCOUNTER
Pt returned the nursing phone call. Discussed with pt it is recommended to get the CTA completed before his appointement with Dr. Drake Scott. Pt states he has CTA Scheduled for 5/18/2023 and follow up appointment with Dr. Drake Scott at the [facility] on 5/22/2023. Pt denies any further questions or concerns at this time. Pt is appreciative of the call.   Call was ended Hemostasis: Jack's

## 2023-05-18 ENCOUNTER — HOSPITAL ENCOUNTER (OUTPATIENT)
Dept: CT IMAGING | Facility: HOSPITAL | Age: 83
Discharge: HOME | End: 2023-05-18
Payer: MEDICARE

## 2023-05-18 DIAGNOSIS — I72.9 ANEURYSM (HCC): ICD-10-CM

## 2023-05-18 LAB
CREAT BLD-MCNC: 1.2 MG/DL
GFR SERPLBLD BASED ON 1.73 SQ M-ARVRAT: 61 ML/MIN/1.73M2 (ref 60–?)

## 2023-05-18 PROCEDURE — 70496 CT ANGIOGRAPHY HEAD: CPT

## 2023-05-18 PROCEDURE — 82565 ASSAY OF CREATININE: CPT

## 2023-05-22 ENCOUNTER — TELEPHONE (OUTPATIENT)
Dept: SURGERY | Facility: CLINIC | Age: 83
End: 2023-05-22

## 2023-05-22 ENCOUNTER — OFFICE VISIT (OUTPATIENT)
Dept: SURGERY | Facility: CLINIC | Age: 83
End: 2023-05-22
Payer: MEDICARE

## 2023-05-22 VITALS
SYSTOLIC BLOOD PRESSURE: 122 MMHG | BODY MASS INDEX: 28.41 KG/M2 | HEIGHT: 67 IN | DIASTOLIC BLOOD PRESSURE: 84 MMHG | WEIGHT: 181 LBS

## 2023-05-22 DIAGNOSIS — I67.1 BRAIN ANEURYSM: Primary | ICD-10-CM

## 2023-05-22 RX ORDER — FLUTICASONE FUROATE, UMECLIDINIUM BROMIDE AND VILANTEROL TRIFENATATE 100; 62.5; 25 UG/1; UG/1; UG/1
1 POWDER RESPIRATORY (INHALATION) DAILY
COMMUNITY
Start: 2023-04-27

## 2023-05-22 RX ORDER — ALBUTEROL SULFATE 90 UG/1
2 AEROSOL, METERED RESPIRATORY (INHALATION) EVERY 6 HOURS PRN
COMMUNITY
Start: 2023-04-27

## 2023-05-22 RX ORDER — INHALER, ASSIST DEVICES
1 SPACER (EA) MISCELLANEOUS AS DIRECTED
COMMUNITY
Start: 2023-04-27

## 2024-08-29 PROBLEM — D46.9 MDS (MYELODYSPLASTIC SYNDROME) (HCC): Status: ACTIVE | Noted: 2024-08-29

## 2024-08-29 PROBLEM — D64.9 ANEMIA, UNSPECIFIED: Status: ACTIVE | Noted: 2024-08-29

## 2024-08-29 PROBLEM — D69.6 THROMBOCYTOPENIA (HCC): Status: ACTIVE | Noted: 2024-08-29

## 2024-08-29 PROBLEM — D69.6 THROMBOCYTOPENIA: Status: ACTIVE | Noted: 2024-08-29

## 2024-08-30 ENCOUNTER — OFFICE VISIT (OUTPATIENT)
Dept: HEMATOLOGY/ONCOLOGY | Facility: HOSPITAL | Age: 84
End: 2024-08-30
Attending: INTERNAL MEDICINE
Payer: MEDICARE

## 2024-08-30 VITALS
OXYGEN SATURATION: 94 % | SYSTOLIC BLOOD PRESSURE: 128 MMHG | RESPIRATION RATE: 18 BRPM | WEIGHT: 189 LBS | DIASTOLIC BLOOD PRESSURE: 76 MMHG | BODY MASS INDEX: 30 KG/M2 | HEART RATE: 60 BPM | TEMPERATURE: 98 F

## 2024-08-30 DIAGNOSIS — D46.9 MDS (MYELODYSPLASTIC SYNDROME) (HCC): ICD-10-CM

## 2024-08-30 DIAGNOSIS — D64.9 ANEMIA, UNSPECIFIED: Primary | ICD-10-CM

## 2024-08-30 DIAGNOSIS — D69.6 THROMBOCYTOPENIA (HCC): ICD-10-CM

## 2024-08-30 LAB
ANTIBODY SCREEN: NEGATIVE
RH BLOOD TYPE: POSITIVE

## 2024-08-30 PROCEDURE — 36415 COLL VENOUS BLD VENIPUNCTURE: CPT

## 2024-08-30 PROCEDURE — 86900 BLOOD TYPING SEROLOGIC ABO: CPT

## 2024-08-30 PROCEDURE — 86850 RBC ANTIBODY SCREEN: CPT

## 2024-08-30 PROCEDURE — 36430 TRANSFUSION BLD/BLD COMPNT: CPT

## 2024-08-30 PROCEDURE — 86901 BLOOD TYPING SEROLOGIC RH(D): CPT

## 2024-08-30 NOTE — PROGRESS NOTES
Education Record    Learner:  Patient    Pt here for: Transfusion    Barriers / Limitations:  None    Method:  Brief focused, printed material and  reinforcement    General Topics:  Plan of care reviewed    Outcome: Pt tolerated transfusion with no c/o.

## 2024-08-31 LAB
BLOOD TYPE BARCODE: 6200
UNIT VOLUME: 350 ML

## 2025-05-22 ENCOUNTER — HOSPITAL ENCOUNTER (OUTPATIENT)
Dept: CT IMAGING | Facility: HOSPITAL | Age: 85
Discharge: HOME | End: 2025-05-22
Attending: NEUROLOGICAL SURGERY
Payer: MEDICARE

## 2025-05-22 DIAGNOSIS — I67.1 BRAIN ANEURYSM (HCC): ICD-10-CM

## 2025-05-22 PROCEDURE — 70496 CT ANGIOGRAPHY HEAD: CPT | Performed by: NEUROLOGICAL SURGERY

## 2025-05-29 ENCOUNTER — TELEPHONE (OUTPATIENT)
Dept: SURGERY | Facility: CLINIC | Age: 85
End: 2025-05-29

## 2025-05-29 ENCOUNTER — OFFICE VISIT (OUTPATIENT)
Dept: SURGERY | Facility: CLINIC | Age: 85
End: 2025-05-29
Payer: MEDICARE

## 2025-05-29 VITALS — OXYGEN SATURATION: 96 % | DIASTOLIC BLOOD PRESSURE: 72 MMHG | HEART RATE: 65 BPM | SYSTOLIC BLOOD PRESSURE: 124 MMHG

## 2025-05-29 DIAGNOSIS — Z98.890 S/P CRANIOTOMY: ICD-10-CM

## 2025-05-29 DIAGNOSIS — I67.1 BRAIN ANEURYSM (HCC): Primary | ICD-10-CM

## 2025-05-29 PROCEDURE — 1160F RVW MEDS BY RX/DR IN RCRD: CPT | Performed by: NURSE PRACTITIONER

## 2025-05-29 PROCEDURE — 99203 OFFICE O/P NEW LOW 30 MIN: CPT | Performed by: NURSE PRACTITIONER

## 2025-05-29 PROCEDURE — 1159F MED LIST DOCD IN RCRD: CPT | Performed by: NURSE PRACTITIONER

## 2025-05-29 PROCEDURE — 3078F DIAST BP <80 MM HG: CPT | Performed by: NURSE PRACTITIONER

## 2025-05-29 PROCEDURE — 3074F SYST BP LT 130 MM HG: CPT | Performed by: NURSE PRACTITIONER

## 2025-05-29 RX ORDER — ALLOPURINOL 300 MG/1
300 TABLET ORAL DAILY
COMMUNITY
Start: 2024-09-03

## 2025-05-29 RX ORDER — POSACONAZOLE 100 MG/1
3 TABLET, DELAYED RELEASE ORAL DAILY
COMMUNITY

## 2025-05-29 RX ORDER — ACYCLOVIR 200 MG/1
400 CAPSULE ORAL 2 TIMES DAILY
COMMUNITY
Start: 2024-08-29

## 2025-05-29 RX ORDER — POTASSIUM CHLORIDE 1500 MG/1
20 TABLET, EXTENDED RELEASE ORAL DAILY
COMMUNITY
Start: 2025-03-17

## 2025-05-29 NOTE — PROGRESS NOTES
[facility]  Neurological Surgery Clinic Note    Jah Salgado  3/5/1940  [id number]  PCP: Jan Menendez MD    REASON FOR VISIT:  Follow up   Brain aneurysm    HISTORY OF PRESENT ILLNESS:  Jah Salgado is a very pleasant 85 year old male who presents to clinic for routine follow up on his intracranial aneurysms. In 2018, during TIA workup, he was found to have multiple brain aneurysms, including an 11mm right pericallosal aneurysm, a 3mm right pericallosal artery aneurysm, a 3mm right ACoA aneurysm, a 3mm right ICA terminus aneurysm, and small aneurysms of the bilateral MCAs. He underwent a bifrontal craniotomy for pericallosal and ACoA aneurysm clipping by Dr. Jimenez at [facility] in 2019. Since that time he has had anosmia, which is stable and reports no new issues. CTA head 5/2023 demonstrates stability of the small unclipped aneurysms and no recurrence of the clipped aneurysms. He denies any recurrent TIA symptoms. Lives independently, plays multiple rounds of golf per week.     Interval History 5/29/25:  Was treated for AML and in remission (diagnosed 3 years ago). Back to normal life. Had a PPM placed. Originally thought to have MDS and after an bone marrow bx found to have AML.No focal neuro deficits or headache complaints.    PAST MEDICAL HISTORY:  Past Medical History[1]    PAST SURGICAL HISTORY:  Past Surgical History[2]    FAMILY HISTORY:  family history includes Cancer in his mother; Other in his brother and father.    SOCIAL HISTORY:   reports that he quit smoking about 18 years ago. His smoking use included cigarettes. He has never used smokeless tobacco. He reports that he does not drink alcohol and does not use drugs.    ALLERGIES:  Allergies[3]    MEDICATIONS:  Medications Ordered Prior to Encounter[4]    REVIEW OF SYSTEMS:  A 10-point system was reviewed.  Pertinent positives and negatives are noted in HPI.      PHYSICAL EXAMINATION:  VITAL SIGNS: /72   Pulse 65    SpO2 96%     A&Ox3, no acute distress  PERRL, EOMi, FS, TM  Full strength x 4, no drift  Sensation intact     Imaging Review:  CTA BRAIN (CPT=70496)  Result Date: 5/22/2025  CONCLUSION:   1. No acute intracranial abnormality identified.  2. Redemonstration of postoperative changes from previous bifrontal craniotomies.  There are multiple aneurysm clips again noted along the pericallosal branches of the anterior cerebral arteries and along the right side of the anterior communicating artery.  There is no obvious recurrent aneurysm identified in these areas within the limitations of the exam.  3. Stable 3 mm aneurysm projecting posteriorly from the proximal A1 segment of the right anterior cerebral artery.  4. Stable encephalomalacia in the frontal lobes with stable minimal chronic microvascular ischemic changes in the cerebral white matter.   Please see above for further details.   LOCATION:  [id number]   Dictated by (CST): Lucas Elise MD on 5/22/2025 at 9:46 AM     Finalized by (CST): Lucas Elise MD on 5/22/2025 at 9:52 AM          ASSESSMENT:  84 yo male with multiple unruptured intracranial aneurysms, including clipping of pericallosal and ACoA aneurysms in 2019, with stable multiple small aneurysms    We discussed the natural history of intracranial aneurysms and the risks of further workup and treatment.  We reviewed the imaging together.  We discussed modifiable risk factors.  We discussed the signs and symptoms of subarachnoid hemorrhage and that emergency medical attention should be sought for these symptoms.  After this detailed discussion and answering all questions, the patient wished to proceed with observation with serial imaging.         Plan:  Follow up with CTA H/N in 2 years    We reviewed stroke symptoms and when to go to the ED. We discussed going to the ED with symptoms of WHOL. The patient was instructed to continue healthy lifestyle habits to include not smoking, managing blood pressure,  healthy eating and regular exercise.    All questions were answered and the patient was instructed to call or message with any additional questions or concerns.     LIANE Kessler, CNP  Neurological Surgery  [facility]    Care Time: 30 min including face to face time, chart review, imaging interpretation, and coordination of care         [1]   Past Medical History:   Aneurysm    Aortic aneurysm    Appendicitis    Cerebral aneurysm (HCC)    History of general anesthesia complication    stiffness after aortic aneurysm surgery    Pulmonary emphysema (HCC)    no home O2   [2]   Past Surgical History:  Procedure Laterality Date    Appendectomy      Colonoscopy  2011    wnl's    Oral surgery  05/14/2019    Other  11/14/2018    Cerebral Angiogram    Other surgical history  2010    Epididimectomy; R testicle atrophied after the surgery. Was part of a vasectomy.     Other surgical history  201    Nasal polyp    Other surgical history  01/15/2019    craniotomy   [3] No Known Allergies  [4]   Current Outpatient Medications on File Prior to Visit   Medication Sig Dispense Refill    Gilteritinib Fumarate 40 MG Oral Tab Take 80 mg by mouth daily.      potassium chloride 20 MEQ Oral Tab CR Take 1 tablet (20 mEq total) by mouth daily.      Posaconazole 100 MG Oral Tab EC Take 3 tablets by mouth daily.      allopurinol 300 MG Oral Tab Take 1 tablet (300 mg total) by mouth daily.      acyclovir 200 MG Oral Cap Take 2 capsules (400 mg total) by mouth 2 (two) times daily.      Coenzyme Q10 (CO Q 10) 100 MG Oral Cap Take 1 tablet by mouth daily.      Venetoclax 50 MG Oral Tab Take 50 mg by mouth daily with breakfast.      albuterol 108 (90 Base) MCG/ACT Inhalation Aero Soln Inhale 2 puffs into the lungs every 6 (six) hours as needed.      TRELEGY ELLIPTA 100-62.5-25 MCG/ACT Inhalation Aerosol Powder, Breath Activated Inhale 1 puff into the lungs daily.      Spacer/Aero-Holding Chambers (VALVED HOLDING CHAMBER) Does  not apply Device Take 1 Bottle by mouth As Directed.       No current facility-administered medications on file prior to visit.

## 2025-05-29 NOTE — TELEPHONE ENCOUNTER
Please place a reminder in 2 years to check in with patient to see if he wants to do a CTA Brain for his aneurysms

## 2025-05-29 NOTE — PROGRESS NOTES
Patient with Brain aneurysm is here today for follow up and review of imaging  5/22/2025 CTA BRAIN     - - Last OV 5/22/2023

## 2025-05-29 NOTE — PATIENT INSTRUCTIONS
Refill policies:    Allow 2-3 business days for refills; controlled substances may take longer.  Contact your pharmacy at least 5 days prior to running out of medication and have them send an electronic request or submit request through the “request refill” option in your [facility] account.  Refills are not addressed on weekends; covering physicians do not authorize routine medications on weekends.  No narcotics or controlled substances are refilled after noon on Fridays or by on call physicians.  By law, narcotics must be electronically prescribed.  A 30 day supply with no refills is the maximum allowed.  If your prescription is due for a refill, you may be due for a follow up appointment.  To best provide you care, patients receiving routine medications need to be seen at least once a year.  Patients receiving narcotic/controlled substance medications need to be seen at least once every 3 months.  In the event that your preferred pharmacy does not have the requested medication in stock (e.g. Backordered), it is your responsibility to find another pharmacy that has the requested medication available.  We will gladly send a new prescription to that pharmacy at your request.    Scheduling Tests:    If your physician has ordered radiology tests such as MRI or CT scans, please contact Central Scheduling at [phone] right away to schedule the test.  Once scheduled, the [facility] Centralized Referral Team will work with your insurance carrier to obtain pre-certification or prior authorization.  Depending on your insurance carrier, approval may take 3-10 days.  It is highly recommended patients assure they have received an authorization before having a test performed.  If test is done without insurance authorization, patient may be responsible for the entire amount billed.      Precertification and Prior Authorizations:  If your physician has recommended that you have a procedure or additional testing performed the [facility]  Centralized Referral Team will contact your insurance carrier to obtain pre-certification or prior authorization.    You are strongly encouraged to contact your insurance carrier to verify that your procedure/test has been approved and is a COVERED benefit.  Although the [facility] Centralized Referral Team does its due diligence, the insurance carrier gives the disclaimer that \"Although the procedure is authorized, this does not guarantee payment.\"    Ultimately the patient is responsible for payment.   Thank you for your understanding in this matter.  Paperwork Completion:  If you require FMLA or disability paperwork for your recovery, please make sure to either drop it off or have it faxed to our office at [phone]. Be sure the form has your name and date of birth on it.  The form will be faxed to our Forms Department and they will complete it for you.  There is a 25$ fee for all forms that need to be filled out.  Please be aware there is a 10-14 day turnaround time.  You will need to sign a release of information (FIDENCIO) form if your paperwork does not come with one.  You may call the Forms Department with any questions at [phone].  Their fax number is [phone].

## (undated) NOTE — LETTER
[facility]  Administration of Anesthesia  1. Jennifer Masterson, or _________________________________ acting on his behalf, (Patient) (Dependent/Representative) request to receive anesthesia for my pending procedure/operation/treatment.   A ph bleeding, seizure, cardiac arrest and death. 7. AWARENESS: I understand that it is possible (but unlikely) to have explicit memory of events from the operating room while under general anesthesia.   8. ELECTROCONVULSIVE THERAPY PATIENTS: This consent serve below affirms that prior to the time of the procedure, I have explained to the patient and/or his/her guardian, the risks and benefits of undergoing anesthesia, as well as any reasonable alternatives.     ___________________________________________________

## (undated) NOTE — LETTER
[facility] [address], [address] [place] [address]    Consent for Anesthesia   1.    Payton Munoz agree to be cared for by an anesthesiologist, who is specially trained to monitor me and give me medicine to put me to sleep or keep me comfortable vision, nerves, or muscles and in extremely rare instances death. 5. My doctor has explained to me other choices available to me for my care (alternatives).   6. Pregnant Patients (“epidural”):  I understand that the risks of having an epidural (medicine g

## (undated) NOTE — LETTER
[facility] [address], [address]  Consent for Procedure/Sedation    Date:     Time:       1. I authorize the performance upon Kip Blankenship the following:  CEREBRAL ANGIOGRAM WITH POSSIBLE CLOSURE DEVICE    2.  I authorize Dr. Roselyn Neil ________________________________    ___________________    Witness: _________________________      Date: ___________________    Printed: 2018   1:21 PM  Patient Name: Ninfa Barone        : 3/5/1940       Medical Record #: [id number]

## (undated) NOTE — LETTER
[address], [facility], [place]  [address]  INFORMED CONSENT FOR TRANSFUSION OF BLOOD OR BLOOD PRODUCTS  My physician has informed me of the nature, purpose, benefits and risks of transfusion for blood and blood components that ______________________________________________  (Signature of Patient)                                                            (Responsible party in case of Minor,

## (undated) NOTE — LETTER
[facility] [address], [address]  Consent for Procedure/Sedation    Date:        Time:       1. I authorize the performance upon Edwardo Newby the following:  Cerebral Angiogram; possible Closure Device     2.  I authorize Dr. Bong Peck ________________________________    ___________________    Witness: _________________________      Date: ___________________    Printed: 2019   10:14 AM  Patient Name: Whitney Le        : 3/5/1940       Medical Record #: [id number]

## (undated) NOTE — LETTER
[facility] [address], [address]  Consent for Procedure/Sedation    Date:        Time:       1.  I authorize the performance upon Erman Scales the following: Peripheral angiography, atherectomy, percutaneous transluminal angioplas you may develop a skin reaction.       Signature of Patient: _______________________________________________________    Signature of person authorized                                           Relationship to  to consent for patient: _______________________